# Patient Record
Sex: FEMALE | Race: WHITE | URBAN - NONMETROPOLITAN AREA
[De-identification: names, ages, dates, MRNs, and addresses within clinical notes are randomized per-mention and may not be internally consistent; named-entity substitution may affect disease eponyms.]

---

## 2017-03-01 ENCOUNTER — OFFICE VISIT (OUTPATIENT)
Dept: FAMILY MEDICINE CLINIC | Age: 22
End: 2017-03-01

## 2017-03-01 VITALS
BODY MASS INDEX: 25.61 KG/M2 | TEMPERATURE: 98.8 F | RESPIRATION RATE: 12 BRPM | HEART RATE: 96 BPM | HEIGHT: 68 IN | SYSTOLIC BLOOD PRESSURE: 122 MMHG | DIASTOLIC BLOOD PRESSURE: 70 MMHG | WEIGHT: 169 LBS

## 2017-03-01 DIAGNOSIS — J40 BRONCHITIS: ICD-10-CM

## 2017-03-01 DIAGNOSIS — J06.9 ACUTE UPPER RESPIRATORY INFECTION: Primary | ICD-10-CM

## 2017-03-01 LAB
INFLUENZA A ANTIBODY: NEGATIVE
INFLUENZA B ANTIBODY: NEGATIVE

## 2017-03-01 PROCEDURE — 99213 OFFICE O/P EST LOW 20 MIN: CPT | Performed by: NURSE PRACTITIONER

## 2017-03-01 PROCEDURE — 87804 INFLUENZA ASSAY W/OPTIC: CPT | Performed by: NURSE PRACTITIONER

## 2017-03-01 RX ORDER — BENZONATATE 100 MG/1
100 CAPSULE ORAL 3 TIMES DAILY PRN
Qty: 30 CAPSULE | Refills: 0 | Status: SHIPPED | OUTPATIENT
Start: 2017-03-01

## 2017-03-07 DIAGNOSIS — J40 BRONCHITIS: Primary | ICD-10-CM

## 2017-03-07 RX ORDER — AZITHROMYCIN 250 MG/1
TABLET, FILM COATED ORAL
Qty: 1 PACKET | Refills: 0 | Status: SHIPPED | OUTPATIENT
Start: 2017-03-07 | End: 2017-03-08 | Stop reason: ALTCHOICE

## 2017-03-08 ENCOUNTER — TELEPHONE (OUTPATIENT)
Dept: FAMILY MEDICINE CLINIC | Age: 22
End: 2017-03-08

## 2017-03-08 DIAGNOSIS — J40 BRONCHITIS: Primary | ICD-10-CM

## 2017-03-08 RX ORDER — CEFDINIR 300 MG/1
300 CAPSULE ORAL 2 TIMES DAILY
Qty: 20 CAPSULE | Refills: 0 | Status: SHIPPED | OUTPATIENT
Start: 2017-03-08 | End: 2017-03-18

## 2018-03-29 ENCOUNTER — OFFICE VISIT CONVERTED (OUTPATIENT)
Dept: ORTHOPEDIC SURGERY | Facility: CLINIC | Age: 23
End: 2018-03-29
Attending: ORTHOPAEDIC SURGERY

## 2018-06-05 ENCOUNTER — OFFICE VISIT CONVERTED (OUTPATIENT)
Dept: ORTHOPEDIC SURGERY | Facility: CLINIC | Age: 23
End: 2018-06-05
Attending: ORTHOPAEDIC SURGERY

## 2018-07-06 ENCOUNTER — OFFICE VISIT CONVERTED (OUTPATIENT)
Dept: ORTHOPEDIC SURGERY | Facility: CLINIC | Age: 23
End: 2018-07-06
Attending: ORTHOPAEDIC SURGERY

## 2021-05-16 VITALS — WEIGHT: 155 LBS | BODY MASS INDEX: 23.49 KG/M2 | HEIGHT: 68 IN

## 2021-05-16 VITALS — WEIGHT: 160 LBS | HEIGHT: 68 IN | OXYGEN SATURATION: 97 % | HEART RATE: 86 BPM | BODY MASS INDEX: 24.25 KG/M2

## 2021-05-16 VITALS — HEIGHT: 68 IN | HEART RATE: 79 BPM | OXYGEN SATURATION: 98 % | WEIGHT: 155.37 LBS | BODY MASS INDEX: 23.55 KG/M2

## 2023-09-13 ENCOUNTER — HOSPITAL ENCOUNTER (OUTPATIENT)
Dept: GENERAL RADIOLOGY | Facility: HOSPITAL | Age: 28
Discharge: HOME | End: 2023-09-13
Payer: COMMERCIAL

## 2023-09-13 ENCOUNTER — TRANSCRIBE ORDERS (OUTPATIENT)
Dept: GENERAL RADIOLOGY | Facility: HOSPITAL | Age: 28
End: 2023-09-13

## 2023-09-13 DIAGNOSIS — M79.621 PAIN IN RIGHT UPPER ARM: ICD-10-CM

## 2023-09-13 DIAGNOSIS — M79.621 PAIN IN RIGHT UPPER ARM: Primary | ICD-10-CM

## 2023-09-13 PROCEDURE — 73030 X-RAY EXAM OF SHOULDER: CPT

## 2023-09-13 PROCEDURE — 73060 X-RAY EXAM OF HUMERUS: CPT

## 2023-11-17 ENCOUNTER — PROCEDURE VISIT (OUTPATIENT)
Dept: NEUROLOGY | Facility: CLINIC | Age: 28
End: 2023-11-17
Payer: COMMERCIAL

## 2023-11-17 VITALS
HEART RATE: 94 BPM | WEIGHT: 188 LBS | HEIGHT: 68 IN | SYSTOLIC BLOOD PRESSURE: 126 MMHG | DIASTOLIC BLOOD PRESSURE: 74 MMHG | BODY MASS INDEX: 28.49 KG/M2

## 2023-11-17 DIAGNOSIS — G89.29 CHRONIC RIGHT SHOULDER PAIN: Primary | ICD-10-CM

## 2023-11-17 DIAGNOSIS — M25.511 CHRONIC RIGHT SHOULDER PAIN: Primary | ICD-10-CM

## 2023-11-18 PROBLEM — M25.511 CHRONIC RIGHT SHOULDER PAIN: Status: ACTIVE | Noted: 2023-11-18

## 2023-11-18 PROBLEM — G89.29 CHRONIC RIGHT SHOULDER PAIN: Status: ACTIVE | Noted: 2023-11-18

## 2023-11-18 NOTE — PROGRESS NOTES
"Chief Complaint  Nerve Study (RUE) and Neurologic Problem (Sharp shooting pain in RUE)    Subjective          Dalia Lucio is a 28 y.o. female who presents to [facility]  History of Present Illness  28-year-old woman evaluated for EMG nerve conduction study.  She is complaining of right shoulder pain and numbness in her hands with raising her arms up.  Worst pain is in her triceps posteriorly.  This happens when she raises her arms.  Objective   Vital Signs:   /74 (BP Location: Left arm, Patient Position: Sitting, Cuff Size: Adult)   Pulse 94   Ht 172.7 cm (67.99\")   Wt 85.3 kg (188 lb)   BMI 28.59 kg/m²     Physical Exam   There is no weakness of the upper extremity on individual muscle testing.  Range of motion is almost normal except there is pain on resting and elevating her arm forward and above her head.  Motion reaching her back is normal.  There is no pain on external rotation elevation.  Phalen sign is negative.        Assessment and Plan  Diagnoses and all orders for this visit:    1. Chronic right shoulder pain (Primary)  Assessment & Plan:  Nerve conduction study and limited EMG of the right upper extremity is normal.  Her complaints of chronic right shoulder pain on elevation is likely to be an orthopedic problem such as subacromial impingement syndrome.  Orthopedic surgery for a proper diagnosis.  I do not think it is related to her brachial plexus or thoracic outlet syndrome.  Thank you for letting me participate in her care.    Orders:  -     Ambulatory Referral to Orthopedic Surgery         Nerve Conduction Study:  4 nerves     EMG:  Limited    Total time spent with the patient and coordinating patient care was 15 minutes.    Follow Up  No follow-ups on file.  Patient was given instructions and counseling regarding her condition or for health maintenance advice. Please see specific information pulled into the AVS if appropriate.       "

## 2023-11-19 NOTE — ASSESSMENT & PLAN NOTE
Nerve conduction study and limited EMG of the right upper extremity is normal.  Her complaints of chronic right shoulder pain on elevation is likely to be an orthopedic problem such as subacromial impingement syndrome.  Orthopedic surgery for a proper diagnosis.  I do not think it is related to her brachial plexus or thoracic outlet syndrome.  Thank you for letting me participate in her care.

## 2023-11-28 ENCOUNTER — OFFICE VISIT (OUTPATIENT)
Dept: ORTHOPEDIC SURGERY | Facility: CLINIC | Age: 28
End: 2023-11-28
Payer: COMMERCIAL

## 2023-11-28 VITALS
BODY MASS INDEX: 28.49 KG/M2 | DIASTOLIC BLOOD PRESSURE: 75 MMHG | SYSTOLIC BLOOD PRESSURE: 120 MMHG | WEIGHT: 188 LBS | HEART RATE: 63 BPM | OXYGEN SATURATION: 96 % | HEIGHT: 68 IN

## 2023-11-28 DIAGNOSIS — M75.41 IMPINGEMENT SYNDROME OF RIGHT SHOULDER: Primary | ICD-10-CM

## 2023-11-28 PROCEDURE — 99203 OFFICE O/P NEW LOW 30 MIN: CPT | Performed by: ORTHOPAEDIC SURGERY

## 2023-11-28 RX ORDER — MELOXICAM 15 MG/1
15 TABLET ORAL DAILY
Qty: 30 TABLET | Refills: 2 | Status: SHIPPED | OUTPATIENT
Start: 2023-11-28

## 2023-11-28 NOTE — PROGRESS NOTES
"Chief Complaint  Initial Evaluation of the Right Shoulder     Subjective      Dalia Lucio presents to [facility] for evaluation of the right shoulder. She reports right shoulder pain for about 1 year. She reports it would come and go but now it is constant. She denies injury or trauma. She has pain with activity away from her body and reaching across. She admits to popping in the shoulder. She recently started therapy with no improvement.     No Known Allergies     Social History     Socioeconomic History    Marital status: Single   Tobacco Use    Smoking status: Never     Passive exposure: Never    Smokeless tobacco: Never   Substance and Sexual Activity    Alcohol use: Never    Drug use: Never    Sexual activity: Yes     Partners: Male     Birth control/protection: I.U.D.        I reviewed the patient's chief complaint, history of present illness, review of systems, past medical history, surgical history, family history, social history, medications, and allergy list.     Review of Systems     Constitutional: Denies fevers, chills, weight loss  Cardiovascular: Denies chest pain, shortness of breath  Skin: Denies rashes, acute skin changes  Neurologic: Denies headache, loss of consciousness  MSK: Right shoulder pain      Vital Signs:   /75   Pulse 63   Ht 172.7 cm (68\")   Wt 85.3 kg (188 lb)   SpO2 96%   BMI 28.59 kg/m²          Physical Exam  General: Alert. No acute distress    Ortho Exam      Right shoulder- Sensation to light touch median, radial, ulnar nerve. Positive AIN, PIN, ulnar nerve motor function. Positive pulses. Tender to the lateral upper arm. Forward elevation 175. Abduction 155. External Rotation 90. Internal rotation 80. 4+ supraspinatus strength. 5/5 infraspinatus and subscapularis. Internal rotation T-12. Pain with cross arm adduction. Negative O'poncho.     Procedures      Imaging Results (Most Recent)       None             Result Review : "       No results found.      X-rays-Unremarkable right shoulder and humerus radiograph.        Assessment and Plan     Diagnoses and all orders for this visit:    1. Impingement syndrome of right shoulder (Primary)        Discussed the treatment plan with the patient.  I reviewed her previous x-rays with the patient. Plan to continue physical therapy at this time. May consider MRI if symptoms persist. Prescription for mobic given today.     Call or return if worsening symptoms.    Follow Up     6 weeks      Patient was given instructions and counseling regarding her condition or for health maintenance advice. Please see specific information pulled into the AVS if appropriate.     Scribed for Salvador Cancino MD by Eva Pretty.  11/28/23   14:27 EST    I have personally performed the services described in this document as scribed by the above individual and it is both accurate and complete. Salvador Cancino MD 11/28/23

## 2024-01-09 ENCOUNTER — OFFICE VISIT (OUTPATIENT)
Dept: ORTHOPEDIC SURGERY | Facility: CLINIC | Age: 29
End: 2024-01-09
Payer: COMMERCIAL

## 2024-01-09 VITALS — WEIGHT: 185 LBS | HEIGHT: 68 IN | BODY MASS INDEX: 28.04 KG/M2

## 2024-01-09 DIAGNOSIS — M75.41 IMPINGEMENT SYNDROME OF RIGHT SHOULDER: Primary | ICD-10-CM

## 2024-01-09 PROCEDURE — 99213 OFFICE O/P EST LOW 20 MIN: CPT | Performed by: ORTHOPAEDIC SURGERY

## 2024-01-09 NOTE — PROGRESS NOTES
"Chief Complaint  Follow-up of the Right Shoulder     Subjective      Dalia Lucio presents to [facility] for follow  evaluation of the right shoulder. The patient has been treating her right shoulder impingement conservatively. She has been attending therapy. She has been taking mobic with minimal relief. She gets numbness to her hand with Forward elevation of the shoulder.     No Known Allergies     Social History     Socioeconomic History    Marital status:    Tobacco Use    Smoking status: Never     Passive exposure: Never    Smokeless tobacco: Never   Substance and Sexual Activity    Alcohol use: Never    Drug use: Never    Sexual activity: Yes     Partners: Male     Birth control/protection: I.U.D.        I reviewed the patient's chief complaint, history of present illness, review of systems, past medical history, surgical history, family history, social history, medications, and allergy list.     Review of Systems     Constitutional: Denies fevers, chills, weight loss  Cardiovascular: Denies chest pain, shortness of breath  Skin: Denies rashes, acute skin changes  Neurologic: Denies headache, loss of consciousness  MSK: Right shoulder pain      Vital Signs:   Ht 172.7 cm (68\")   Wt 83.9 kg (185 lb)   BMI 28.13 kg/m²          Physical Exam  General: Alert. No acute distress    Ortho Exam      Right upper extremity-  Sensation to light touch median, radial, ulnar nerve. Positive AIN, PIN, ulnar nerve motor function. Positive pulses. Tender to the lateral upper arm. Forward elevation 175. Abduction 155. External Rotation 90. Internal rotation 80. 4+ supraspinatus strength. 5/5 infraspinatus and subscapularis. Internal rotation T-12. Pain with cross arm adduction. Negative O'poncho.      Procedures      Imaging Results (Most Recent)       None             Result Review :       No results found.           Assessment and Plan     Diagnoses and all orders for this " visit:    1. Impingement syndrome of right shoulder (Primary)        Discussed the treatment plan with the patient.  The patient has failed conservative treatment at this time. Order for MRI of the right shoulder given today. The patient expressed understanding and wished to proceed.     Call or return if worsening symptoms.    Follow Up     MRI results      Patient was given instructions and counseling regarding her condition or for health maintenance advice. Please see specific information pulled into the AVS if appropriate.     Scribed for Salvador Cancino MD by Eva Pretty.  01/09/24   16:10 EST    I have personally performed the services described in this document as scribed by the above individual and it is both accurate and complete. Salvador Cancino MD 01/09/24

## 2024-03-06 ENCOUNTER — HOSPITAL ENCOUNTER (OUTPATIENT)
Dept: MRI IMAGING | Facility: HOSPITAL | Age: 29
Discharge: HOME | End: 2024-03-06
Admitting: ORTHOPAEDIC SURGERY
Payer: COMMERCIAL

## 2024-03-06 DIAGNOSIS — M75.41 IMPINGEMENT SYNDROME OF RIGHT SHOULDER: ICD-10-CM

## 2024-03-06 PROCEDURE — 73221 MRI JOINT UPR EXTREM W/O DYE: CPT

## 2024-03-14 ENCOUNTER — OFFICE VISIT (OUTPATIENT)
Dept: ORTHOPEDIC SURGERY | Facility: CLINIC | Age: 29
End: 2024-03-14
Payer: COMMERCIAL

## 2024-03-14 VITALS — HEART RATE: 72 BPM | HEIGHT: 68 IN | BODY MASS INDEX: 28.04 KG/M2 | WEIGHT: 185 LBS | OXYGEN SATURATION: 97 %

## 2024-03-14 DIAGNOSIS — M19.011 OSTEOARTHRITIS OF RIGHT SHOULDER, UNSPECIFIED OSTEOARTHRITIS TYPE: ICD-10-CM

## 2024-03-14 DIAGNOSIS — S46.011A TRAUMATIC TEAR OF RIGHT ROTATOR CUFF, UNSPECIFIED TEAR EXTENT, INITIAL ENCOUNTER: Primary | ICD-10-CM

## 2024-03-14 DIAGNOSIS — M19.019 OSTEOARTHRITIS OF AC (ACROMIOCLAVICULAR) JOINT: ICD-10-CM

## 2024-03-14 NOTE — PROGRESS NOTES
"Chief Complaint  Follow-up and Pain of the Right Shoulder     Subjective      Dalia Lucio presents to [facility] for follow up evaluation of the right shoulder. The patient recently had an MRI and is here today for those results. To review, The patient has been treating her right shoulder impingement conservatively. She has been attending therapy. She has been taking mobic with minimal relief. She gets numbness to her hand with Forward elevation of the shoulder. She reports symptoms for about 1 year.    No Known Allergies     Social History     Socioeconomic History    Marital status:    Tobacco Use    Smoking status: Never     Passive exposure: Never    Smokeless tobacco: Never   Substance and Sexual Activity    Alcohol use: Never    Drug use: Never    Sexual activity: Yes     Partners: Male     Birth control/protection: I.U.D.        I reviewed the patient's chief complaint, history of present illness, review of systems, past medical history, surgical history, family history, social history, medications, and allergy list.     Review of Systems     Constitutional: Denies fevers, chills, weight loss  Cardiovascular: Denies chest pain, shortness of breath  Skin: Denies rashes, acute skin changes  Neurologic: Denies headache, loss of consciousness  MSK: right shoulder pain      Vital Signs:   Pulse 72   Ht 172.7 cm (68\")   Wt 83.9 kg (185 lb)   SpO2 97%   BMI 28.13 kg/m²          Physical Exam  General: Alert. No acute distress    Ortho Exam      Right upper extremity-  Sensation to light touch median, radial, ulnar nerve. Positive AIN, PIN, ulnar nerve motor function. Positive pulses. Tender to the lateral upper arm. Forward elevation 175. Abduction 155. External Rotation 90. Internal rotation 80. 4+ supraspinatus strength. 5/5 infraspinatus and subscapularis. Internal rotation T-12. Pain with cross arm adduction. Negative O'poncho.       Procedures      Imaging Results " (Most Recent)       None             Result Review :       MRI Shoulder Right Without Contrast    Result Date: 3/7/2024  Narrative: PROCEDURE: MRI SHOULDER RIGHT WO CONTRAST  COMPARISON: None  INDICATIONS: INTERMITTENT RIGHT SHOULDER PAIN RADIATING INTO ARM WITH LIMITED RANGE OF MOTION  FOR 1 YEAR. NO KNOWN INJURY.      TECHNIQUE: A variety of imaging planes and parameters were utilized for visualization of suspected pathology.  Images were performed without contrast.   FINDINGS:  Changes of tendinopathy are seen throughout the rotator cuff.  There is a small focal full-thickness tear involving the distal attachment of the anterior fibers of the supraspinatus component.  Additional partial thickness bursal surface tear is noted throughout the distal supraspinatus and infraspinatus components of the cuff.  There is no significant retraction of torn fibers.  Increased overlying bursal fluid is noted.  The biceps anchor is intact.  There is no evidence for complete biceps tendon tear or distal retraction.  The tendon is identified in the expected position in the intertubercular sulcus.  No definitive labral tear is seen.  The glenohumeral joint is intact.  Mild changes of osteoarthritis are noted.  There is no significant joint effusion.  The acromioclavicular joint is intact.  Mild hypertrophic age related changes are noted.  The volume of the rotator cuff musculature is within normal limits.  No significant abnormal focal bone marrow signal is seen.  The cortical margins are grossly intact.  The surrounding soft tissues are unremarkable.      Impression:   1. Changes of tendinopathy are seen throughout the rotator cuff.  There is superimposed focal full-thickness tear involving the distal attachment of the anterior fibers of the supraspinatus component. 2. Evidence for partial thickness bursal surface tear involving the distal attachment of the supraspinatus and infraspinatus components of the cuff. 3. No evidence  for biceps tendon tear or labral tear. 4. Mild osteoarthritis of the glenohumeral joint.  Mild age related changes of the acromioclavicular joint are noted.      LAURENCE ELIZABETH MD       Electronically Signed and Approved By: LAURENCE ELIZABETH MD on 3/07/2024 at 9:27                     Assessment and Plan     Diagnoses and all orders for this visit:    1. Traumatic tear of right rotator cuff, unspecified tear extent, initial encounter (Primary)    2. Osteoarthritis of right shoulder, unspecified osteoarthritis type    3. Osteoarthritis of AC (acromioclavicular) joint        Discussed the treatment options with the patient, operative vs non-operative.  I reviewed the MRI with the patient. Discussed the risks and benefits of a right shoulder arthroscopic vs mini open rotator cuff repair, SAD, possible DCE vs continuing conservative treatment. The patient expressed understanding and wished to proceed with operative treatment.    Discussed surgery., Risks/benefits discussed with patient including, but not limited to: infection, bleeding, neurovascular damage, re-rupture, aesthetic deformity, need for further surgery, and death., Discussed with patient the implant type being used during surgery and patient understands., Surgery pamphlet given., and Call or return if worsening symptoms.    Follow Up     2 weeks postoperatively.        Patient was given instructions and counseling regarding her condition or for health maintenance advice. Please see specific information pulled into the AVS if appropriate.     Scribed for Salvador Cancino MD by Eva Pretty.  03/14/24   15:56 EDT    I have personally performed the services described in this document as scribed by the above individual and it is both accurate and complete. Salvador Cancino MD 03/14/24

## 2024-03-14 NOTE — H&P (VIEW-ONLY)
"Chief Complaint  Follow-up and Pain of the Right Shoulder     Subjective      Dalia Lucio presents to [facility] for follow up evaluation of the right shoulder. The patient recently had an MRI and is here today for those results. To review, The patient has been treating her right shoulder impingement conservatively. She has been attending therapy. She has been taking mobic with minimal relief. She gets numbness to her hand with Forward elevation of the shoulder. She reports symptoms for about 1 year.    No Known Allergies     Social History     Socioeconomic History    Marital status:    Tobacco Use    Smoking status: Never     Passive exposure: Never    Smokeless tobacco: Never   Substance and Sexual Activity    Alcohol use: Never    Drug use: Never    Sexual activity: Yes     Partners: Male     Birth control/protection: I.U.D.        I reviewed the patient's chief complaint, history of present illness, review of systems, past medical history, surgical history, family history, social history, medications, and allergy list.     Review of Systems     Constitutional: Denies fevers, chills, weight loss  Cardiovascular: Denies chest pain, shortness of breath  Skin: Denies rashes, acute skin changes  Neurologic: Denies headache, loss of consciousness  MSK: right shoulder pain      Vital Signs:   Pulse 72   Ht 172.7 cm (68\")   Wt 83.9 kg (185 lb)   SpO2 97%   BMI 28.13 kg/m²          Physical Exam  General: Alert. No acute distress    Ortho Exam      Right upper extremity-  Sensation to light touch median, radial, ulnar nerve. Positive AIN, PIN, ulnar nerve motor function. Positive pulses. Tender to the lateral upper arm. Forward elevation 175. Abduction 155. External Rotation 90. Internal rotation 80. 4+ supraspinatus strength. 5/5 infraspinatus and subscapularis. Internal rotation T-12. Pain with cross arm adduction. Negative O'poncho.       Procedures      Imaging Results " Appt scheduled for 3/14/18, patient notified. dmk   (Most Recent)       None             Result Review :       MRI Shoulder Right Without Contrast    Result Date: 3/7/2024  Narrative: PROCEDURE: MRI SHOULDER RIGHT WO CONTRAST  COMPARISON: None  INDICATIONS: INTERMITTENT RIGHT SHOULDER PAIN RADIATING INTO ARM WITH LIMITED RANGE OF MOTION  FOR 1 YEAR. NO KNOWN INJURY.      TECHNIQUE: A variety of imaging planes and parameters were utilized for visualization of suspected pathology.  Images were performed without contrast.   FINDINGS:  Changes of tendinopathy are seen throughout the rotator cuff.  There is a small focal full-thickness tear involving the distal attachment of the anterior fibers of the supraspinatus component.  Additional partial thickness bursal surface tear is noted throughout the distal supraspinatus and infraspinatus components of the cuff.  There is no significant retraction of torn fibers.  Increased overlying bursal fluid is noted.  The biceps anchor is intact.  There is no evidence for complete biceps tendon tear or distal retraction.  The tendon is identified in the expected position in the intertubercular sulcus.  No definitive labral tear is seen.  The glenohumeral joint is intact.  Mild changes of osteoarthritis are noted.  There is no significant joint effusion.  The acromioclavicular joint is intact.  Mild hypertrophic age related changes are noted.  The volume of the rotator cuff musculature is within normal limits.  No significant abnormal focal bone marrow signal is seen.  The cortical margins are grossly intact.  The surrounding soft tissues are unremarkable.      Impression:   1. Changes of tendinopathy are seen throughout the rotator cuff.  There is superimposed focal full-thickness tear involving the distal attachment of the anterior fibers of the supraspinatus component. 2. Evidence for partial thickness bursal surface tear involving the distal attachment of the supraspinatus and infraspinatus components of the cuff. 3. No evidence  for biceps tendon tear or labral tear. 4. Mild osteoarthritis of the glenohumeral joint.  Mild age related changes of the acromioclavicular joint are noted.      LAURENCE ELIZABETH MD       Electronically Signed and Approved By: LAURENCE ELIZABETH MD on 3/07/2024 at 9:27                     Assessment and Plan     Diagnoses and all orders for this visit:    1. Traumatic tear of right rotator cuff, unspecified tear extent, initial encounter (Primary)    2. Osteoarthritis of right shoulder, unspecified osteoarthritis type    3. Osteoarthritis of AC (acromioclavicular) joint        Discussed the treatment options with the patient, operative vs non-operative.  I reviewed the MRI with the patient. Discussed the risks and benefits of a right shoulder arthroscopic vs mini open rotator cuff repair, SAD, possible DCE vs continuing conservative treatment. The patient expressed understanding and wished to proceed with operative treatment.    Discussed surgery., Risks/benefits discussed with patient including, but not limited to: infection, bleeding, neurovascular damage, re-rupture, aesthetic deformity, need for further surgery, and death., Discussed with patient the implant type being used during surgery and patient understands., Surgery pamphlet given., and Call or return if worsening symptoms.    Follow Up     2 weeks postoperatively.        Patient was given instructions and counseling regarding her condition or for health maintenance advice. Please see specific information pulled into the AVS if appropriate.     Scribed for Salvador Cancino MD by Eva Pretty.  03/14/24   15:56 EDT    I have personally performed the services described in this document as scribed by the above individual and it is both accurate and complete. Salvador Cancino MD 03/14/24

## 2024-03-15 PROBLEM — S46.011A TRAUMATIC TEAR OF RIGHT ROTATOR CUFF: Status: ACTIVE | Noted: 2024-03-14

## 2024-04-08 ENCOUNTER — ANESTHESIA EVENT (OUTPATIENT)
Dept: PERIOP | Facility: HOSPITAL | Age: 29
End: 2024-04-08
Payer: COMMERCIAL

## 2024-04-08 ENCOUNTER — HOSPITAL ENCOUNTER (OUTPATIENT)
Facility: HOSPITAL | Age: 29
Setting detail: HOSPITAL OUTPATIENT SURGERY
Discharge: HOME | End: 2024-04-08
Attending: ORTHOPAEDIC SURGERY | Admitting: ORTHOPAEDIC SURGERY
Payer: COMMERCIAL

## 2024-04-08 ENCOUNTER — ANESTHESIA (OUTPATIENT)
Dept: PERIOP | Facility: HOSPITAL | Age: 29
End: 2024-04-08
Payer: COMMERCIAL

## 2024-04-08 ENCOUNTER — ANESTHESIA EVENT CONVERTED (OUTPATIENT)
Dept: ANESTHESIOLOGY | Facility: HOSPITAL | Age: 29
End: 2024-04-08
Payer: COMMERCIAL

## 2024-04-08 VITALS
RESPIRATION RATE: 16 BRPM | OXYGEN SATURATION: 97 % | HEIGHT: 68 IN | TEMPERATURE: 97.1 F | BODY MASS INDEX: 27.77 KG/M2 | WEIGHT: 183.2 LBS | DIASTOLIC BLOOD PRESSURE: 69 MMHG | SYSTOLIC BLOOD PRESSURE: 105 MMHG | HEART RATE: 73 BPM

## 2024-04-08 DIAGNOSIS — S46.011A TRAUMATIC TEAR OF RIGHT ROTATOR CUFF, UNSPECIFIED TEAR EXTENT, INITIAL ENCOUNTER: ICD-10-CM

## 2024-04-08 LAB — B-HCG UR QL: NEGATIVE

## 2024-04-08 PROCEDURE — 25010000002 ROPIVACAINE PER 1 MG: Performed by: ANESTHESIOLOGY

## 2024-04-08 PROCEDURE — 25010000002 PROPOFOL 10 MG/ML EMULSION: Performed by: NURSE ANESTHETIST, CERTIFIED REGISTERED

## 2024-04-08 PROCEDURE — L3670 SO ACRO/CLAV CAN WEB PRE OTS: HCPCS | Performed by: ORTHOPAEDIC SURGERY

## 2024-04-08 PROCEDURE — 25010000002 FENTANYL CITRATE (PF) 50 MCG/ML SOLUTION: Performed by: NURSE ANESTHETIST, CERTIFIED REGISTERED

## 2024-04-08 PROCEDURE — 25810000003 LACTATED RINGERS PER 1000 ML: Performed by: ANESTHESIOLOGY

## 2024-04-08 PROCEDURE — 25010000002 FENTANYL CITRATE (PF) 50 MCG/ML SOLUTION: Performed by: ANESTHESIOLOGY

## 2024-04-08 PROCEDURE — 25010000002 DEXAMETHASONE PER 1 MG: Performed by: NURSE ANESTHETIST, CERTIFIED REGISTERED

## 2024-04-08 PROCEDURE — 81025 URINE PREGNANCY TEST: CPT | Performed by: ORTHOPAEDIC SURGERY

## 2024-04-08 PROCEDURE — 25010000002 MIDAZOLAM PER 1MG: Performed by: ANESTHESIOLOGY

## 2024-04-08 PROCEDURE — 25010000002 MEPERIDINE PER 100 MG: Performed by: NURSE ANESTHETIST, CERTIFIED REGISTERED

## 2024-04-08 PROCEDURE — 25010000002 ONDANSETRON PER 1 MG: Performed by: NURSE ANESTHETIST, CERTIFIED REGISTERED

## 2024-04-08 PROCEDURE — C1713 ANCHOR/SCREW BN/BN,TIS/BN: HCPCS | Performed by: ORTHOPAEDIC SURGERY

## 2024-04-08 PROCEDURE — 25010000002 SUGAMMADEX 200 MG/2ML SOLUTION: Performed by: NURSE ANESTHETIST, CERTIFIED REGISTERED

## 2024-04-08 PROCEDURE — 25010000002 CEFAZOLIN SODIUM 2 G RECONSTITUTED SOLUTION: Performed by: ORTHOPAEDIC SURGERY

## 2024-04-08 DEVICE — SUT/ANCH 2.6/FIBERTAK DBL/LD/W/1.3MM SUT TP SP: Type: IMPLANTABLE DEVICE | Site: SHOULDER | Status: FUNCTIONAL

## 2024-04-08 DEVICE — SUT/ANCH BIOCOMP SWIVELOCK/C 4.75X19.1MM: Type: IMPLANTABLE DEVICE | Site: SHOULDER | Status: FUNCTIONAL

## 2024-04-08 RX ORDER — ROCURONIUM BROMIDE 10 MG/ML
INJECTION, SOLUTION INTRAVENOUS AS NEEDED
Status: DISCONTINUED | OUTPATIENT
Start: 2024-04-08 | End: 2024-04-08 | Stop reason: SURG

## 2024-04-08 RX ORDER — HYDROCODONE BITARTRATE AND ACETAMINOPHEN 7.5; 325 MG/1; MG/1
1-2 TABLET ORAL EVERY 4 HOURS PRN
Qty: 40 TABLET | Refills: 0 | Status: SHIPPED | OUTPATIENT
Start: 2024-04-08

## 2024-04-08 RX ORDER — LIDOCAINE HYDROCHLORIDE 20 MG/ML
INJECTION, SOLUTION EPIDURAL; INFILTRATION; INTRACAUDAL; PERINEURAL AS NEEDED
Status: DISCONTINUED | OUTPATIENT
Start: 2024-04-08 | End: 2024-04-08 | Stop reason: SURG

## 2024-04-08 RX ORDER — ACETAMINOPHEN 500 MG
1000 TABLET ORAL ONCE
Status: COMPLETED | OUTPATIENT
Start: 2024-04-08 | End: 2024-04-08

## 2024-04-08 RX ORDER — SODIUM CHLORIDE, SODIUM LACTATE, POTASSIUM CHLORIDE, CALCIUM CHLORIDE 600; 310; 30; 20 MG/100ML; MG/100ML; MG/100ML; MG/100ML
9 INJECTION, SOLUTION INTRAVENOUS CONTINUOUS PRN
Status: DISCONTINUED | OUTPATIENT
Start: 2024-04-08 | End: 2024-04-08 | Stop reason: HOSPADM

## 2024-04-08 RX ORDER — MIDAZOLAM HYDROCHLORIDE 2 MG/2ML
2 INJECTION, SOLUTION INTRAMUSCULAR; INTRAVENOUS ONCE
Status: COMPLETED | OUTPATIENT
Start: 2024-04-08 | End: 2024-04-08

## 2024-04-08 RX ORDER — IBUPROFEN 800 MG/1
800 TABLET ORAL EVERY 8 HOURS PRN
Qty: 60 TABLET | Refills: 1 | Status: SHIPPED | OUTPATIENT
Start: 2024-04-08

## 2024-04-08 RX ORDER — MEPERIDINE HYDROCHLORIDE 25 MG/ML
12.5 INJECTION INTRAMUSCULAR; INTRAVENOUS; SUBCUTANEOUS
Status: DISCONTINUED | OUTPATIENT
Start: 2024-04-08 | End: 2024-04-08 | Stop reason: HOSPADM

## 2024-04-08 RX ORDER — BUPIVACAINE HCL/0.9 % NACL/PF 0.1 %
2 PLASTIC BAG, INJECTION (ML) EPIDURAL ONCE
Status: COMPLETED | OUTPATIENT
Start: 2024-04-08 | End: 2024-04-08

## 2024-04-08 RX ORDER — PROMETHAZINE HYDROCHLORIDE 25 MG/1
25 SUPPOSITORY RECTAL ONCE AS NEEDED
Status: DISCONTINUED | OUTPATIENT
Start: 2024-04-08 | End: 2024-04-08 | Stop reason: HOSPADM

## 2024-04-08 RX ORDER — FENTANYL CITRATE 50 UG/ML
100 INJECTION, SOLUTION INTRAMUSCULAR; INTRAVENOUS ONCE
Status: COMPLETED | OUTPATIENT
Start: 2024-04-08 | End: 2024-04-08

## 2024-04-08 RX ORDER — DEXAMETHASONE SODIUM PHOSPHATE 4 MG/ML
INJECTION, SOLUTION INTRA-ARTICULAR; INTRALESIONAL; INTRAMUSCULAR; INTRAVENOUS; SOFT TISSUE AS NEEDED
Status: DISCONTINUED | OUTPATIENT
Start: 2024-04-08 | End: 2024-04-08 | Stop reason: SURG

## 2024-04-08 RX ORDER — ONDANSETRON 2 MG/ML
INJECTION INTRAMUSCULAR; INTRAVENOUS AS NEEDED
Status: DISCONTINUED | OUTPATIENT
Start: 2024-04-08 | End: 2024-04-08 | Stop reason: SURG

## 2024-04-08 RX ORDER — ROPIVACAINE HYDROCHLORIDE 5 MG/ML
INJECTION, SOLUTION EPIDURAL; INFILTRATION; PERINEURAL
Status: COMPLETED | OUTPATIENT
Start: 2024-04-08 | End: 2024-04-08

## 2024-04-08 RX ORDER — PROMETHAZINE HYDROCHLORIDE 12.5 MG/1
25 TABLET ORAL ONCE AS NEEDED
Status: DISCONTINUED | OUTPATIENT
Start: 2024-04-08 | End: 2024-04-08 | Stop reason: HOSPADM

## 2024-04-08 RX ORDER — ONDANSETRON 2 MG/ML
4 INJECTION INTRAMUSCULAR; INTRAVENOUS ONCE AS NEEDED
Status: DISCONTINUED | OUTPATIENT
Start: 2024-04-08 | End: 2024-04-08 | Stop reason: HOSPADM

## 2024-04-08 RX ORDER — FENTANYL CITRATE 50 UG/ML
INJECTION, SOLUTION INTRAMUSCULAR; INTRAVENOUS AS NEEDED
Status: DISCONTINUED | OUTPATIENT
Start: 2024-04-08 | End: 2024-04-08 | Stop reason: SURG

## 2024-04-08 RX ORDER — SCOLOPAMINE TRANSDERMAL SYSTEM 1 MG/1
1 PATCH, EXTENDED RELEASE TRANSDERMAL ONCE
Status: DISCONTINUED | OUTPATIENT
Start: 2024-04-08 | End: 2024-04-08 | Stop reason: HOSPADM

## 2024-04-08 RX ORDER — PROPOFOL 10 MG/ML
VIAL (ML) INTRAVENOUS AS NEEDED
Status: DISCONTINUED | OUTPATIENT
Start: 2024-04-08 | End: 2024-04-08 | Stop reason: SURG

## 2024-04-08 RX ORDER — NALOXONE HYDROCHLORIDE 4 MG/.1ML
SPRAY NASAL
Qty: 2 EACH | Refills: 0 | Status: SHIPPED | OUTPATIENT
Start: 2024-04-08

## 2024-04-08 RX ORDER — OXYCODONE HYDROCHLORIDE 5 MG/1
5 TABLET ORAL
Status: DISCONTINUED | OUTPATIENT
Start: 2024-04-08 | End: 2024-04-08 | Stop reason: HOSPADM

## 2024-04-08 RX ADMIN — ROCURONIUM BROMIDE 40 MG: 10 INJECTION, SOLUTION INTRAVENOUS at 13:20

## 2024-04-08 RX ADMIN — Medication 2 G: at 13:23

## 2024-04-08 RX ADMIN — ACETAMINOPHEN 1000 MG: 500 TABLET ORAL at 12:05

## 2024-04-08 RX ADMIN — ONDANSETRON HYDROCHLORIDE 4 MG: 2 SOLUTION INTRAMUSCULAR; INTRAVENOUS at 13:40

## 2024-04-08 RX ADMIN — FENTANYL CITRATE 25 MCG: 50 INJECTION, SOLUTION INTRAMUSCULAR; INTRAVENOUS at 13:25

## 2024-04-08 RX ADMIN — MIDAZOLAM HYDROCHLORIDE 2 MG: 1 INJECTION, SOLUTION INTRAMUSCULAR; INTRAVENOUS at 12:12

## 2024-04-08 RX ADMIN — FENTANYL CITRATE 50 MCG: 50 INJECTION, SOLUTION INTRAMUSCULAR; INTRAVENOUS at 13:18

## 2024-04-08 RX ADMIN — MEPERIDINE HYDROCHLORIDE 12.5 MG: 25 INJECTION INTRAMUSCULAR; INTRAVENOUS; SUBCUTANEOUS at 14:57

## 2024-04-08 RX ADMIN — DEXAMETHASONE SODIUM PHOSPHATE 4 MG: 4 INJECTION, SOLUTION INTRAMUSCULAR; INTRAVENOUS at 13:28

## 2024-04-08 RX ADMIN — FENTANYL CITRATE 25 MCG: 50 INJECTION, SOLUTION INTRAMUSCULAR; INTRAVENOUS at 14:31

## 2024-04-08 RX ADMIN — SCOPALAMINE 1 PATCH: 1 PATCH, EXTENDED RELEASE TRANSDERMAL at 12:20

## 2024-04-08 RX ADMIN — PROPOFOL 180 MG: 10 INJECTION, EMULSION INTRAVENOUS at 13:20

## 2024-04-08 RX ADMIN — SUGAMMADEX 200 MG: 100 INJECTION, SOLUTION INTRAVENOUS at 14:37

## 2024-04-08 RX ADMIN — ROPIVACAINE HYDROCHLORIDE 20 ML: 5 INJECTION, SOLUTION EPIDURAL; INFILTRATION; PERINEURAL at 12:16

## 2024-04-08 RX ADMIN — FENTANYL CITRATE 100 MCG: 50 INJECTION, SOLUTION INTRAMUSCULAR; INTRAVENOUS at 12:12

## 2024-04-08 RX ADMIN — SODIUM CHLORIDE, POTASSIUM CHLORIDE, SODIUM LACTATE AND CALCIUM CHLORIDE 9 ML/HR: 600; 310; 30; 20 INJECTION, SOLUTION INTRAVENOUS at 12:22

## 2024-04-08 RX ADMIN — SODIUM CHLORIDE, POTASSIUM CHLORIDE, SODIUM LACTATE AND CALCIUM CHLORIDE: 600; 310; 30; 20 INJECTION, SOLUTION INTRAVENOUS at 14:40

## 2024-04-08 RX ADMIN — LIDOCAINE HYDROCHLORIDE 60 MG: 20 INJECTION, SOLUTION INTRAVENOUS at 13:18

## 2024-04-08 NOTE — ANESTHESIA PREPROCEDURE EVALUATION
Anesthesia Evaluation     Patient summary reviewed and Nursing notes reviewed   no history of anesthetic complications:   NPO Solid Status: > 8 hours  NPO Liquid Status: > 2 hours           Airway   Mallampati: I  TM distance: >3 FB  Neck ROM: full  Dental - normal exam     Pulmonary - negative pulmonary ROS and normal exam   Cardiovascular - negative cardio ROS and normal exam  Exercise tolerance: good (4-7 METS)        Neuro/Psych- negative ROS  GI/Hepatic/Renal/Endo - negative ROS     Musculoskeletal (-) negative ROS    Abdominal  - normal exam   Substance History - negative use     OB/GYN negative ob/gyn ROS         Other - negative ROS       ROS/Med Hx Other: PAT Nursing Notes unavailable.               Anesthesia Plan    ASA 1     general with block     intravenous induction     Anesthetic plan, risks, benefits, and alternatives have been provided, discussed and informed consent has been obtained with: patient.    Plan discussed with CRNA.    CODE STATUS:

## 2024-04-08 NOTE — OP NOTE
SHOULDER ARTHROSCOPY WITH ROTATOR CUFF REPAIR  Procedure Report    Patient Name:  Dalia Lucio  YOB: 1995    Date of Surgery:  4/8/2024     Indications:  Patient has rotator cuff tear and has failed conservative treatment. Risks and benefits of surgery were discussed, including bleeding, infection, damage to neurovascular structures, anesthesia complications including death, continued pain and disability, need for additional procedures, among others. Informed consent was obtained and they wished to proceed.    Pre-op Diagnosis:   Traumatic tear of right rotator cuff, unspecified tear extent, initial encounter [S46.011A]       Post-Op Diagnosis Codes:     * Traumatic tear of right rotator cuff, unspecified tear extent, initial encounter [S46.011A]    Procedure/CPT® Codes:      Procedure(s):  right SHOULDER ARTHROSCOPY, MINI OPEN ROTATOR CUFF REPAIR SUBACROMIAL DECOMPRESSION    Staff:  Surgeon(s):  Salvador Cancino MD    Assistant: Patrick Kenney RN    Anesthesia: General with Block    Estimated Blood Loss:  20cc    Implants:    Implant Name Type Inv. Item Serial No.  Lot No. LRB No. Used Action   SUT/ANCH 2.6/FIBERTAK DBL/LD/W/1.3MM SUT TP SP - [id number] Implant SUT/ANCH 2.6/FIBERTAK DBL/LD/W/1.3MM SUT TP   [employer] [id number] Right 1 Implanted   SUT/ANCH BIOCOMP SWIVELOCK/C 4.75X19.1MM - [id number] Implant SUT/ANCH BIOCOMP SWIVELOCK/C 4.75X19.1MM  [employer] [id number] Right 1 Implanted       Specimen:          None        Findings: rotator cuff tear    Complications: none    Description of Procedure: The operative site was marked in preoperative holding area.  Interscalene nerve block was performed by the anesthesia provider.  The patient was brought the operating room and general anesthesia was applied.  There were placed in the lateral decubitus position.  An axillary roll was placed and the patient was secured with a deflated beanbag.  The legs were padded and SCD boots  were placed.  The contralateral limb was placed on an arm board and the affected limb was prepped and draped in usual sterile fashion and placed into a sterile traction arm smith.  Preoperative antibiotic was given.  Formal timeout was held.    Standard posterior portal was established and the arthroscope was inserted into the glenohumeral joint.  An anterior portal was established in the rotator cuff interval and a cannula was placed anteriorly.  The joint was inspected and the intra-articular and findings included intact biceps tendon.  Intact labrum.  No significant chondromalacia.  Small what appears to be full-thickness nonretracted tear of the anterior supraspinatus just posterior to the biceps tendon.    The arthroscope was removed and reinserted into the subacromial space posteriorly.  The lateral portal was established of the lateral acromion.  A motorized shaver and ablation probe were used to clear the subacromial space of adhesions and bursitis.  A motorized bur was used to resect approximately 1 cm from the anterior acromion and flatten the acromion from anterior to posterior and lateral to medial.    The subacromial findings include downsloping acromion.  There was a thinned portion of the anterior supraspinatus tendon with a small full-thickness tear measuring around 0.5 to 1 cm anterior to posterior diameter.  This was not significantly retracted.    At this point the arthroscope was removed and the lateral portal was extended into a 3 cm incision.  The deltoid was split and the bursa was excised.  The rotator cuff tear was identified and mobilized.  A bleeding surface was treated at the greater tuberosity and a fiber tack Arthrex anchors was placed for medial row at the anterior medial greater tuberosity placed.  A scorpion suture passer was used to pass sutures through the rotator cuff and they were tied in alternating  knot fashion.  The sutures were then passed laterally through a swivel lock  anchor which was placed for a lateral row anchor.  The sutures were tensioned and the anchor was deployed.  The sutures were cut and the repair was secure.    The wound was irrigated and was closed with #1 Vicryl at the deltoid fascia level, 2-0 Vicryl at the subcutaneous level, and running Monocryl suture at the skin.  Mastisol Steri-Strips and sterile dressing was placed.  The portal incisions were closed with nylon suture.    Sterile dressing was placed.  The patient was placed into cold therapy and an immobilizer.  The patient awoke from anesthesia in stable condition.  There is no complications.  All counts were correct.    Assistant: Patrick Kenney RN  was responsible for performing the following activities: Retraction, Suction, Irrigation, and Placing Dressing and their skilled assistance was necessary for the success of this case.    SURGICAL APPROACH: Palmer Cancino MD     Date: 4/8/2024  Time: 14:36 EDT

## 2024-04-08 NOTE — DISCHARGE INSTRUCTIONS
DISCHARGE INSTRUCTIONS  Post-Operative  Arthroscopic Shoulder Surgery      For your surgery you had:  General anesthesia (you may have a sore throat for the first 24 hours)  You received an anesthesia medication today that can cause hormonal forms of birth control to be ineffective. You should use a different form of birth control (to prevent pregnancy) for 7 days.       You may experience dizziness, drowsiness, or light-headedness for several hours following surgery/procedure.  Do not stay alone today or tonight.  Limit your activity for 24 hours.  Resume your diet slowly.  Follow whatever special dietary instructions you may have been given by your doctor.  You should not drive or operate machinery or drink alcohol for 24 hours or while you are taking pain medication.  You should not sign legally binding documents.  Post-Operative Day #1 is counted as the 1st day after surgery.  [x] If you had a regional block, you will not have control of your arm for up to 12 hours.  Protect the arm with a sling or follow your physician's specific instructions.  Post-Operative Day #2 wednesday  Remove your dressing.  Under the dressing you will either have sutures or staples.  Change dressing once or twice daily.  Place a dry dressing or band-aids over the small incisions.  Prior to dressing change, wash your hands.  Post-Operative Day #2 wednesday  You may shower.  During the shower, you may let the water hit the incision and roll down but do not scrub or place any soap on the incision.  Do not soak it.  Pat it dry and do not put any ointments on the incision unless directed by surgeon. Then replace the dry dressing.    General Instructions  [x] Use ice pack to shoulder for 72 hours.  This can help with reducing swelling and pain relief.   Never place ice directly on skin.  Avoid getting dressing wet.    Keep arm elevated with sling to decrease swelling and minimize throbbing pain.  The sling will provide support for your  shoulder.  It is important to remove the sling 3-5 times daily to work on range-of-motion of the elbow, wrist and hand.  You will receive a prescription for physical therapy, unless otherwise instructed by physician.  After most shoulder surgeries, it is permissible to start exercises by slowing trying to crawl your fingers up a wall until your arm is parallel to the floor.  While doing this support the affected arm at the elbow or closer to the shoulder.  You may also lean over and let the arm and hand do small or large circles or write the alphabet with your hanging arm to keep it loose.  It is normal to have some pain with these gentle exercises.  The exercises help reduce swelling and pain overall and help to avoid a stiff shoulder post-operatively.  It is okay to come out of the sling for showering or for certain activities of daily living but avoid any lifting or carrying with the affected arm until instructed by the physician especially if you have had a repair of the rotator cuff or some type of reconstructive procedure.  It is okay to come out of the sling and support the arm with a pillow if you remain sedentary.  In general, sling use is preferred following a repair or reconstruction for 4 weeks.  If you have had a SAD (sub acromial decompression), continue sling use more liberally because there was not a repair or reconstruction that could be harmed.          DISCHARGE INSTRUCTIONS  Post-Operative   Arthroscopic Shoulder Surgery      Exercise fingers for 10 minutes every hour while awake.  The physician will typically inform the family and the patient whether or not a repair or reconstruction could be harmed.  If you have had a rotator cuff repair or reconstructive procedure, do not let the arm drop down suddenly from an elevated position down to your side despite improvements made in pain and over the 3 months following repair and reconstruction.  It generally takes 8-12 weeks before the repair or  reconstruction does not rely on sutures and anchors that are placed for the repair.  You are generally given a prescription for a narcotic medication.  Take as prescribed.  You may use over-the-counter anti-inflammatory medications such as Motrin or Aleve for additional pain or fever reduction if not allergic.  If you are taking the pain medication prescribed, do not take Tylenol too unless you consult with the pharmacist. The pain medications generally have Tylenol in them and too much Tylenol can be harmful.  Sometimes it is recommended to take an anti-inflammatory in between doses of prescription pain medication if additional pain relief is needed.  Consult with your physician or your pharmacist before taking over-the-counter pain medications/anti-inflammatories.  It is not uncommon to have a fever post-operatively especially in the first 24-48 hours.  Anti-inflammatories can be used for fever reduction also.  It is normal to have some redness or irritation around skin sutures or staples, however, if you have any expanding areas of redness with a persistent fever and increasing pain notify the physician as soon as possible.  The incidence of blood clots is low following arthroscopic procedures, however, if you notice any increasing pain or swelling in your calves or legs, contact the physician as soon as possible.   It is difficult to sleep in the customary fashion following a shoulder surgery.  It is usually necessary to sleep with the shoulder above the level of the heart such as in a recliner, couch or with the head of the bed elevated.  This should slowly improve over the weeks following shoulder surgery.  If unable to urinate 6 to 8 hours after surgery or urinating frequently in small amounts, notify the physician or go to the nearest Emergency Room.  SPECIAL INSTRUCTIONS:        NOTIFY YOUR PHYSICIAN IF YOU EXPERIENCE:  Numbness of fingers.  Inability to move fingers.  Extreme coldness, paleness or blue  dis-coloration of fingers.  Any pain other than from the incision, such as swelling of the arm that blocks circulation of fingers.  Follow verbal instructions from your doctor.  Medications per physician instructions as indicated on Discharge Medication Information Sheet.  You should see  ______________________for follow-up care  on     Phone number: __________________________________  Missing your appointment/follow-up could lead to serious complications  If you have an emergency and cannot reach your doctor, go to an Emergency Room nearest your home.

## 2024-04-08 NOTE — ANESTHESIA POSTPROCEDURE EVALUATION
Patient: Dalia Lucio    Procedure Summary       Date: 04/08/24 Room / Location: [facility] OR  / [facility]    Anesthesia Start: 1313 Anesthesia Stop: 1447    Procedure: right SHOULDER ARTHROSCOPY, MINI OPEN ROTATOR CUFF REPAIR SUBACROMIAL DECOMPRESSION (Right: Shoulder) Diagnosis:       Traumatic tear of right rotator cuff, unspecified tear extent, initial encounter      (Traumatic tear of right rotator cuff, unspecified tear extent, initial encounter [S46.011A])    Surgeons: Salvador Cancino MD Provider: Cesario Dinero MD    Anesthesia Type: general with block ASA Status: 1            Anesthesia Type: general with block    Vitals  Vitals Value Taken Time   /66 04/08/24 1515   Temp 36 °C (96.8 °F) 04/08/24 1445   Pulse 80 04/08/24 1527   Resp 17 04/08/24 1445   SpO2 100 % 04/08/24 1527   Vitals shown include unfiled device data.        Post Anesthesia Care and Evaluation    Patient location during evaluation: bedside  Patient participation: complete - patient participated  Level of consciousness: awake  Pain management: adequate    Airway patency: patent  PONV Status: none  Cardiovascular status: acceptable and stable  Respiratory status: acceptable  Hydration status: acceptable    Comments: An Anesthesiologist personally participated in the most demanding procedures (including induction and emergence if applicable) in the anesthesia plan, monitored the course of anesthesia administration at frequent intervals and remained physically present and available for immediate diagnosis and treatment of emergencies.

## 2024-04-08 NOTE — ANESTHESIA PROCEDURE NOTES
Peripheral Block    Pre-sedation assessment completed: 4/8/2024 12:04 PM    Patient reassessed immediately prior to procedure    Patient location during procedure: pre-op  Start time: 4/8/2024 12:14 PM  Stop time: 4/8/2024 12:16 PM  Reason for block: at surgeon's request and post-op pain management  Performed by  Anesthesiologist: Cesario Dinero MD  Preanesthetic Checklist  Completed: patient identified, IV checked, site marked, risks and benefits discussed, surgical consent, monitors and equipment checked, pre-op evaluation and timeout performed  Prep:  Pt Position: supine (HOB elevated)  Sterile barriers:cap, washed/disinfected hands, sterile barriers, gloves, mask, partial drape and alcohol skin prep  Prep: ChloraPrep  Patient monitoring: blood pressure monitoring, continuous pulse oximetry and EKG  Procedure    Sedation: yes  Performed under: local infiltration  Guidance:ultrasound guided and nerve stimulator    ULTRASOUND INTERPRETATION.  Using ultrasound guidance a 22 G gauge needle was placed in close proximity to the brachial plexus nerve, at which point, under ultrasound guidance anesthetic was injected in the area of the nerve and spread of the anesthesia was seen on ultrasound in close proximity thereto.  There were no abnormalities seen on ultrasound; a digital image was taken; and the patient tolerated the procedure with no complications. Images:still images obtained, printed/placed on chart    Laterality:right  Block Type:interscalene  Injection Technique:single-shot  Needle Type:echogenic  Needle Gauge:22 G (2in)  Resistance on Injection: none    Medications Used: ropivacaine (NAROPIN) 0.5 % injection - Injection   20 mL - 4/8/2024 12:16:00 PM      Post Assessment  Injection Assessment: negative aspiration for heme, no paresthesia on injection and incremental injection  Patient Tolerance:comfortable throughout block  Complications:no  Additional Notes  The block or continuous infusion is requested  by the referring physician for management of postoperative pain, or pain related to a procedure. Ultrasound guidance (deemed medically necessary). Painless injection, pt was awake and conversant during the procedure without complications. Needle and surrounding structures visualized throughout procedure. No adverse reactions or complications seen during this period. Post-procedure image showed no signs of complication, and anatomy was consistent with an uncomplicated nerve blockade.

## 2024-04-22 ENCOUNTER — OFFICE VISIT (OUTPATIENT)
Dept: ORTHOPEDIC SURGERY | Facility: CLINIC | Age: 29
End: 2024-04-22
Payer: COMMERCIAL

## 2024-04-22 VITALS
WEIGHT: 183.2 LBS | SYSTOLIC BLOOD PRESSURE: 116 MMHG | BODY MASS INDEX: 27.77 KG/M2 | OXYGEN SATURATION: 98 % | DIASTOLIC BLOOD PRESSURE: 74 MMHG | HEART RATE: 72 BPM | HEIGHT: 68 IN

## 2024-04-22 DIAGNOSIS — Z47.89 AFTERCARE FOLLOWING SURGERY OF THE MUSCULOSKELETAL SYSTEM: Primary | ICD-10-CM

## 2024-04-22 PROCEDURE — 99024 POSTOP FOLLOW-UP VISIT: CPT | Performed by: PHYSICIAN ASSISTANT

## 2024-04-22 NOTE — PROGRESS NOTES
"Chief Complaint  Pain and Follow-up of the Right Shoulder and Suture / Staple Removal    Subjective          History of Present Illness      Dalia Lucio is a 28 y.o. female  presents to [facility] for     Patient presents for 2-week postop evaluation right shoulder arthroscopic subacromial decompression, mini open rotator cuff repair, 4/8/2024.  Patient states she has been doing well with pain control she states she has not needed the pain medicine she still has some if she needs it but mainly she has been using ice and ibuprofen.  She is attending therapy at \[facility]\"" in [place] she does her home exercises.  She presents in her sling she states the sling has been causing her pain but she is compliant with it.  She also states she has some pain in the radial wrist with movement.  She denies injury to the wrist she denies numbness and tingling.      No Known Allergies     Social History     Socioeconomic History    Marital status:    Tobacco Use    Smoking status: Never     Passive exposure: Never    Smokeless tobacco: Never   Vaping Use    Vaping status: Never Used   Substance and Sexual Activity    Alcohol use: Never    Drug use: Never    Sexual activity: Yes     Partners: Male     Birth control/protection: I.U.D.        REVIEW OF SYSTEMS    Constitutional: Awake alert and oriented x3, no acute distress, denies fevers, chills, weight loss  Respiratory: No respiratory distress  Vascular: Brisk cap refill, Intact distal pulses, No cyanosis, compartments soft with no signs or symptoms of compartment syndrome or DVT.   Cardiovascular: Denies chest pain, shortness of breath  Skin: Denies rashes, acute skin changes  Neurologic: Denies headache, loss of consciousness  MSK: Right shoulder pain      Objective   Vital Signs:   /74   Pulse 72   Ht 172.7 cm (68\")   Wt 83.1 kg (183 lb 3.2 oz)   SpO2 98%   BMI 27.86 kg/m²     Body mass index is 27.86 kg/m².    Physical " Exam       Right shoulder: Incisions are healing well, no erythema, no drainage or dehiscence, no signs of infection, sutures were removed and Steri-Strips were placed.  Range of motion appropriate, elbow wrist hand range of motion intact/appropriate, she does have tenderness to the radial wrist with ulnar and radial deviation pain is in this region, no pain with extension and flexion, patient will wiggle fingers and thumb makes a full fist.      Procedures    Imaging Results (Most Recent)       None             Result Review :   The following data was reviewed by: JOSUE Mclean on 04/22/2024:               Assessment and Plan    Diagnoses and all orders for this visit:    1. Aftercare following surgery of right shoulder arthroscopic subacromial decompression, mini open rotator cuff repair, 4/8/2024 (Primary)        Reviewed operative report with and operative images with the patient she was advised to continue sling use for another 4 weeks, avoid heavy lift push pull activity, she has pain meds if she needs it she may call for refills.  Sutures/staples removed in office today. Steri-strips applied. Discussed incision care/hygiene. May shower, but do not submerge in water until fully healed.  Advised patient that if any concerning symptoms regarding incision appearance occur that they should call us right away, patient expressed understanding.  Follow-up in 4 weeks    Call or return if worsening symptoms.    Follow Up   Return in about 4 weeks (around 5/20/2024).  Patient was given instructions and counseling regarding her condition or for health maintenance advice. Please see specific information pulled into the AVS if appropriate.       EMR Dragon/Transcription disclaimer:  Part of this note may be an electronic transcription/translation of spoken language to printed text using the Dragon Dictation System

## 2024-05-20 ENCOUNTER — OFFICE VISIT (OUTPATIENT)
Dept: ORTHOPEDIC SURGERY | Facility: CLINIC | Age: 29
End: 2024-05-20
Payer: COMMERCIAL

## 2024-05-20 VITALS
OXYGEN SATURATION: 97 % | HEIGHT: 68 IN | WEIGHT: 183.2 LBS | DIASTOLIC BLOOD PRESSURE: 67 MMHG | BODY MASS INDEX: 27.77 KG/M2 | HEART RATE: 79 BPM | SYSTOLIC BLOOD PRESSURE: 109 MMHG

## 2024-05-20 DIAGNOSIS — Z47.89 AFTERCARE FOLLOWING SURGERY OF THE MUSCULOSKELETAL SYSTEM: Primary | ICD-10-CM

## 2024-05-20 PROCEDURE — 99024 POSTOP FOLLOW-UP VISIT: CPT | Performed by: PHYSICIAN ASSISTANT

## 2024-05-20 NOTE — PROGRESS NOTES
"Chief Complaint  Pain and Follow-up of the Right Shoulder    Subjective          History of Present Illness      Dalia Lucio is a 28 y.o. female  presents to [facility] for     Patient presents with her  Armond for follow-up evaluation of right shoulder arthroscopic subacromial decompression mini open rotator cuff repair, 4/8/2024.  She presents in her sling she has been compliant with sling use she states the incisions have been healing well and denies redness drainage or dehiscence.  She is attending therapy 3 times a week at \[facility]".  Patient states she is not taking pain medication or NSAIDs she is happy with her progress, for the shoulder.  She states her wrist has been hurting she has some swelling to the right wrist ever since her surgery.  She may want to be seen for this in the future    No Known Allergies     Social History     Socioeconomic History    Marital status:    Tobacco Use    Smoking status: Never     Passive exposure: Never    Smokeless tobacco: Never   Vaping Use    Vaping status: Never Used   Substance and Sexual Activity    Alcohol use: Never    Drug use: Never    Sexual activity: Yes     Partners: Male     Birth control/protection: I.U.D.        REVIEW OF SYSTEMS    Constitutional: Awake alert and oriented x3, no acute distress, denies fevers, chills, weight loss  Respiratory: No respiratory distress  Vascular: Brisk cap refill, Intact distal pulses, No cyanosis, compartments soft with no signs or symptoms of compartment syndrome or DVT.   Cardiovascular: Denies chest pain, shortness of breath  Skin: Denies rashes, acute skin changes  Neurologic: Denies headache, loss of consciousness  MSK: Right shoulder pain      Objective   Vital Signs:   /67   Pulse 79   Ht 172.7 cm (68\")   Wt 83.1 kg (183 lb 3.2 oz)   SpO2 97%   BMI 27.86 kg/m²     Body mass index is 27.86 kg/m².    Physical Exam       Right shoulder: Incisions are healing " well, no erythema, no drainage or dehiscence, no signs of infection, active forward elevation 90 external rotation at side 40 abduction 80, elbow wrist hand range of motion intact/appropriate      Procedures    Imaging Results (Most Recent)       None             Result Review :   The following data was reviewed by: JOSUE Mclean on 05/20/2024:               Assessment and Plan    Diagnoses and all orders for this visit:    1. Aftercare following surgery of right shoulder arthroscopic subacromial decompression, mini open rotator cuff repair, 4/8/2024 (Primary)  -     Ambulatory Referral to Physical Therapy        Discussed diagnosis and treatment options with the patient she was advised to discontinue sling use, continue therapy for for range of motion and strengthening, follow-up in 6 weeks.  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  Patient was advised to avoid heavy lift push pull activities  Call or return if worsening symptoms.    Follow Up   Return in about 6 weeks (around 7/1/2024) for Recheck.  Patient was given instructions and counseling regarding her condition or for health maintenance advice. Please see specific information pulled into the AVS if appropriate.       EMR Dragon/Transcription disclaimer:  Part of this note may be an electronic transcription/translation of spoken language to printed text using the Dragon Dictation System

## 2024-05-21 ENCOUNTER — TELEPHONE (OUTPATIENT)
Dept: ORTHOPEDIC SURGERY | Facility: CLINIC | Age: 29
End: 2024-05-21

## 2024-05-21 NOTE — TELEPHONE ENCOUNTER
Caller: PATIENT    Relationship: SELF    Best call back number: [phone]    What form or medical record are you requesting: WORK RELEASE NOTE    Timeframe paperwork needed: SHE RETURNED TO WORK TODAY 5/21/24    Additional notes: SHE WOULD LIKE TO  NOTE TODAY IF POSSIBLE. PLEASE CALL WHEN COMPLETED

## 2024-06-27 ENCOUNTER — HOSPITAL ENCOUNTER (OUTPATIENT)
Dept: GENERAL RADIOLOGY | Facility: HOSPITAL | Age: 29
Discharge: HOME | End: 2024-06-27
Payer: COMMERCIAL

## 2024-06-27 ENCOUNTER — TRANSCRIBE ORDERS (OUTPATIENT)
Dept: GENERAL RADIOLOGY | Facility: HOSPITAL | Age: 29
End: 2024-06-27
Payer: COMMERCIAL

## 2024-06-27 DIAGNOSIS — R52 PAIN: Primary | ICD-10-CM

## 2024-06-27 DIAGNOSIS — R52 PAIN: ICD-10-CM

## 2024-06-27 PROCEDURE — 73110 X-RAY EXAM OF WRIST: CPT

## 2024-06-27 PROCEDURE — 73130 X-RAY EXAM OF HAND: CPT

## 2024-06-27 PROCEDURE — 73522 X-RAY EXAM HIPS BI 3-4 VIEWS: CPT

## 2024-07-01 ENCOUNTER — OFFICE VISIT (OUTPATIENT)
Dept: ORTHOPEDIC SURGERY | Facility: CLINIC | Age: 29
End: 2024-07-01
Payer: COMMERCIAL

## 2024-07-01 VITALS — HEART RATE: 89 BPM | SYSTOLIC BLOOD PRESSURE: 133 MMHG | OXYGEN SATURATION: 98 % | DIASTOLIC BLOOD PRESSURE: 73 MMHG

## 2024-07-01 DIAGNOSIS — Z47.89 AFTERCARE FOLLOWING SURGERY OF THE MUSCULOSKELETAL SYSTEM: Primary | ICD-10-CM

## 2024-07-01 PROCEDURE — 99024 POSTOP FOLLOW-UP VISIT: CPT | Performed by: PHYSICIAN ASSISTANT

## 2024-07-01 NOTE — PROGRESS NOTES
Chief Complaint  Follow-up of the Right Shoulder    Subjective          History of Present Illness      Dalia Lucio is a 28 y.o. female  presents to [facility] for     Patient presents with her  for follow-up evaluation of right shoulder arthroscopic subacromial decompression, mini open rotator cuff repair, 4/8/2024.  Patient states her pain is controlled she denies need for pain medication or NSAIDs.  She states she gets stiff/sore at times when at rest at work or working at her desk and also at night when she sleeps.  She is attending therapy at [facility].  She states the incisions have healed well.  She states she is able to move her shoulder more and use it more with certain activities but she avoids any heavy lift push pull activity.  She has been on light duty at work.  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    No Known Allergies     Social History     Socioeconomic History    Marital status:    Tobacco Use    Smoking status: Never     Passive exposure: Never    Smokeless tobacco: Never   Vaping Use    Vaping status: Never Used   Substance and Sexual Activity    Alcohol use: Never    Drug use: Never    Sexual activity: Yes     Partners: Male     Birth control/protection: I.U.D.        REVIEW OF SYSTEMS    Constitutional: Awake alert and oriented x3, no acute distress, denies fevers, chills, weight loss  Respiratory: No respiratory distress  Vascular: Brisk cap refill, Intact distal pulses, No cyanosis, compartments soft with no signs or symptoms of compartment syndrome or DVT.   Cardiovascular: Denies chest pain, shortness of breath  Skin: Denies rashes, acute skin changes  Neurologic: Denies headache, loss of consciousness  MSK: Right shoulder pain      Objective   Vital Signs:   /73   Pulse 89   SpO2  98%     There is no height or weight on file to calculate BMI.    Physical Exam       Right shoulder: Well-healed incisions, no erythema, no ecchymosis or swelling, no drainage, no atrophy or signs of infection.  Active forward elevation 130 passive forward elevation 150 active abduction 90 passive abduction 100 external rotation with abduction 40 internal rotation 40, strength appropriate, neurovascularly intact      Procedures    Imaging Results (Most Recent)       None             Result Review :   The following data was reviewed by: JOSUE Mclean on 07/01/2024:               Assessment and Plan    Diagnoses and all orders for this visit:    1. Aftercare following surgery of right shoulder arthroscopic subacromial decompression, mini open rotator cuff repair, 4/8/2024 (Primary)  -     Ambulatory Referral to Physical Therapy        Discussed diagnosis and treatment options with the patient she was advised we recommend continuing therapy she was given new order for this, she was released to full duty no restrictions, she was advised to avoid heavy lift push pull activities follow-up in 6 weeks for recheck    Call or return if worsening symptoms.    Follow Up   Return in about 6 weeks (around 8/12/2024) for Recheck.  Patient was given instructions and counseling regarding her condition or for health maintenance advice. Please see specific information pulled into the AVS if appropriate.       EMR Dragon/Transcription disclaimer:  Part of this note may be an electronic transcription/translation of spoken language to printed text using the Dragon Dictation System

## 2024-08-12 ENCOUNTER — OFFICE VISIT (OUTPATIENT)
Dept: ORTHOPEDIC SURGERY | Facility: CLINIC | Age: 29
End: 2024-08-12
Payer: COMMERCIAL

## 2024-08-12 VITALS
WEIGHT: 185 LBS | HEIGHT: 68 IN | SYSTOLIC BLOOD PRESSURE: 107 MMHG | OXYGEN SATURATION: 98 % | BODY MASS INDEX: 28.04 KG/M2 | HEART RATE: 77 BPM | DIASTOLIC BLOOD PRESSURE: 72 MMHG

## 2024-08-12 DIAGNOSIS — Z47.89 AFTERCARE FOLLOWING SURGERY OF THE MUSCULOSKELETAL SYSTEM: Primary | ICD-10-CM

## 2024-08-12 PROCEDURE — 99213 OFFICE O/P EST LOW 20 MIN: CPT | Performed by: PHYSICIAN ASSISTANT

## 2024-08-12 NOTE — PROGRESS NOTES
"Chief Complaint  Follow-up of the Right Shoulder    Subjective          History of Present Illness      Dalia Lucio is a 28 y.o. female  presents to [facility] for     Patient presents for follow-up evaluation of right shoulder arthroscopic subacromial decompression, mini open rotator cuff repair, 4/8/2024.  Patient states that she is doing well she denies need for pain medication or NSAIDs she states that [facility] [address] and she is happy with her progress.  She denies new injury or symptoms of pain she states that work is going well she states activities of daily living are improving, no new complaints.      No Known Allergies     Social History     Socioeconomic History    Marital status:    Tobacco Use    Smoking status: Never     Passive exposure: Never    Smokeless tobacco: Never   Vaping Use    Vaping status: Never Used   Substance and Sexual Activity    Alcohol use: Never    Drug use: Never    Sexual activity: Yes     Partners: Male     Birth control/protection: I.U.D.     Comment: Getting IUD out monday 08/19/2024        REVIEW OF SYSTEMS    Constitutional: Awake alert and oriented x3, no acute distress, denies fevers, chills, weight loss  Respiratory: No respiratory distress  Vascular: Brisk cap refill, Intact distal pulses, No cyanosis, compartments soft with no signs or symptoms of compartment syndrome or DVT.   Cardiovascular: Denies chest pain, shortness of breath  Skin: Denies rashes, acute skin changes  Neurologic: Denies headache, loss of consciousness  MSK: Right shoulder pain      Objective   Vital Signs:   /72   Pulse 77   Ht 172.7 cm (67.99\")   Wt 83.9 kg (185 lb)   SpO2 98%   BMI 28.14 kg/m²     Body mass index is 28.14 kg/m².    Physical Exam       Right shoulder: Well-healed incisions, no erythema, no ecchymosis or swelling, no signs of infection active forward elevation 160 " active abduction 100 external rotation with abduction 80 internal rotation to T12, elbow wrist hand range of motion intact/appropriate, neurovascularly intact      Procedures    Imaging Results (Most Recent)       None             Result Review :   The following data was reviewed by: JOSUE Mclean on 08/12/2024:               Assessment and Plan    Diagnoses and all orders for this visit:    1. Aftercare following surgery of right shoulder arthroscopic subacromial decompression, mini open rotator cuff repair, 4/8/2024 (Primary)  -     Ambulatory Referral to Physical Therapy for Evaluation & Treatment        Discussed diagnosis and treatment options with the patient patient was advised to continue therapy she was given new order for this, continue activity as tolerated, avoid heavy lift push pull activity follow-up in 6 weeks for recheck    Call or return if worsening symptoms.    Follow Up   Return in about 6 weeks (around 9/23/2024) for Recheck.  Patient was given instructions and counseling regarding her condition or for health maintenance advice. Please see specific information pulled into the AVS if appropriate.       EMR Dragon/Transcription disclaimer:  Part of this note may be an electronic transcription/translation of spoken language to printed text using the Dragon Dictation System

## 2024-08-15 NOTE — PROGRESS NOTES
"Well Woman Visit    CC: Scheduled annual well gyn visit  Chief Complaint   Patient presents with    Annual Exam       HPI:   28 y.o.   Social History     Substance and Sexual Activity   Sexual Activity Yes    Partners: Male    Birth control/protection: I.U.D.    Comment: Getting IUD out 2024       28-year-old female G0 with Mirena IUD here for AP.  Patient desires removal of her IUD.  She is not sure what she desires to use for birth control in the future.  Her last Pap smear was 3 years ago and was negative per her account.  Pt has no complaints today.    PCP: does manage PMHx and preventative labs  History: PMHx, Meds, Allergies, PSHx, Social Hx, and POBHx all reviewed and updated.    PHYSICAL EXAM:  /75   Pulse 86   Ht 172.7 cm (67.99\")   Wt 88.5 kg (195 lb)   Breastfeeding No   BMI 29.66 kg/m²  Not found.  Chaperone present  General- NAD, alert and oriented, appropriate  Psych- Normal mood, good memory  Neck- No masses, no thyroid enlargement  CV- Regular rhythm, no murmurs  Resp- CTA to bases, no wheezes  Abdomen- Soft, non distended, non tender, no masses    Chaperone present during breast and/or pelvic exam if performed.  Breast left-  Bilaterally symmetrical, no masses, non tender, no nipple discharge, no axillary or supraclavicular nodes palpable.    Breast right- Bilaterally symmetrical, no masses, non tender, no nipple discharge, no axillary or supraclavicular nodes palpable.      External genitalia- Normal female, no lesions  Urethra/meatus- Normal, no masses, non tender  Bladder- Normal, no masses, non tender  Vagina- Normal, no atrophy, no lesions, no discharge.      Cvx- Normal, no lesions, no discharge, No cervical motion tenderness, IUD strings visable 1.5cm  Uterus- Normal size, shape & consistency.  Non tender, mobile.  Adnexa- No mass, non tender  Anus/Rectum/Perineum- Not performed    Lymphatic- No palpable neck, axillary, or groin nodes  Ext- No edema, no cyanosis "    Skin- No lesions, no rashes, no acanthosis nigricans  IUD string grasped with Alexsandra tenaculum and the IUD was removed without incident it was intact and was discarded.  Patient tolerated the procedure well.    ASSESSMENT and PLAN:    Diagnoses and all orders for this visit:    1. Well woman exam with routine gynecological exam (Primary)  -     IGP,rfx Aptima HPV All Pth    2. Encounter for IUD removal        Preventative:  BREAST HEALTH- Monthly self breast exam importance and how to reviewed. MMG and/or MRI (prn) reviewed per society guidelines and her individual history. Screen: Not medically needed  CERVICAL CANCER Screening- Reviewed current ASCCP guidelines for screening w and wo cotest HPV, age specific.  Screen: Updated today  COLON CANCER Screening- Reviewed current medical society guidelines and options.  Screen:  Not medically needed  Follow up PCP/Specialist PMHx and/or Labs          She understands the importance of having any ordered tests to be performed in a timely fashion.  The risks of not performing them include, but are not limited to, advanced cancer stages, bone loss from osteoporosis and/or subsequent increase in morbidity and/or mortality.  She is encouraged to review her results online and/or contact or office if she has questions.     Follow Up:  Return in about 1 year (around 8/19/2025) for Annual physical.            Idania Brown, DO  08/19/2024    [facility]  [address] [place] [address]  Dept: [phone]  Dept Fax: [phone]  Loc: [phone]  Loc Fax: [phone]

## 2024-08-19 ENCOUNTER — OFFICE VISIT (OUTPATIENT)
Dept: OBSTETRICS AND GYNECOLOGY | Facility: CLINIC | Age: 29
End: 2024-08-19
Payer: COMMERCIAL

## 2024-08-19 VITALS
HEART RATE: 86 BPM | HEIGHT: 68 IN | BODY MASS INDEX: 29.55 KG/M2 | DIASTOLIC BLOOD PRESSURE: 75 MMHG | SYSTOLIC BLOOD PRESSURE: 120 MMHG | WEIGHT: 195 LBS

## 2024-08-19 DIAGNOSIS — Z30.432 ENCOUNTER FOR IUD REMOVAL: ICD-10-CM

## 2024-08-19 DIAGNOSIS — Z01.419 WELL WOMAN EXAM WITH ROUTINE GYNECOLOGICAL EXAM: Primary | ICD-10-CM

## 2024-08-19 PROCEDURE — 99459 PELVIC EXAMINATION: CPT | Performed by: OBSTETRICS & GYNECOLOGY

## 2024-08-19 PROCEDURE — 58301 REMOVE INTRAUTERINE DEVICE: CPT | Performed by: OBSTETRICS & GYNECOLOGY

## 2024-08-19 PROCEDURE — G0123 SCREEN CERV/VAG THIN LAYER: HCPCS | Performed by: OBSTETRICS & GYNECOLOGY

## 2024-08-19 PROCEDURE — 99385 PREV VISIT NEW AGE 18-39: CPT | Performed by: OBSTETRICS & GYNECOLOGY

## 2024-08-22 LAB
CONV .: NORMAL
CYTOLOGIST CVX/VAG CYTO: NORMAL
CYTOLOGY CVX/VAG DOC CYTO: NORMAL
CYTOLOGY CVX/VAG DOC THIN PREP: NORMAL
DX ICD CODE: NORMAL
Lab: NORMAL
OTHER STN SPEC: NORMAL
STAT OF ADQ CVX/VAG CYTO-IMP: NORMAL

## 2024-09-11 ENCOUNTER — OFFICE VISIT (OUTPATIENT)
Dept: SURGERY | Facility: CLINIC | Age: 29
End: 2024-09-11
Payer: COMMERCIAL

## 2024-09-11 VITALS — RESPIRATION RATE: 16 BRPM | HEIGHT: 68 IN | BODY MASS INDEX: 30.37 KG/M2 | WEIGHT: 200.4 LBS

## 2024-09-11 DIAGNOSIS — K82.4 GALLBLADDER POLYP: ICD-10-CM

## 2024-09-11 DIAGNOSIS — R14.0 BLOATING: Primary | ICD-10-CM

## 2024-09-11 NOTE — LETTER
September 12, 2024       No Recipients    Patient: Dalia Lucio   YOB: 1995   Date of Visit: 9/11/2024     Dear JANINE Carrillo:       Thank you for referring Dalia Lucio to me for evaluation. Below are the relevant portions of my assessment and plan of care.    If you have questions, please do not hesitate to call me. I look forward to following Dalia along with you.         Sincerely,        Antonio Doe MD        CC:   No Recipients    Antonio Doe MD  09/12/24 0814  Sign when Signing Visit  General Surgery/Colorectal Surgery Note    Patient Name:  Dalia Lucio  YOB: 1995  [id number]    Referring Provider: Flori Lester APRN      Patient Care Team:  Flori Lester APRN as PCP - General (Family Medicine)    Chief complaint abdominal pain    Subjective.     History of present illness:    History of some intermittent central sharp abdominal pain with no relationship to food.  Long history of constipation with bloating.  No previous upper or lower endoscopy.  Ultrasound right upper quadrant at outside facility with questionable gallstone versus gallbladder polyp 6 x 4 mm.  No previous abdominal surgery.  No history of pancreatitis.  No weight loss.  No fiber use.  No blood thinner use.      History:  Past Medical History:   Diagnosis Date   • Anxiety    • Depression    • Rotator cuff tear     RIGHT       Past Surgical History:   Procedure Laterality Date   • KNEE ARTHROSCOPY Left     X2 ACL/MENISCUS REPAIR   • SHOULDER ARTHROSCOPY W/ ROTATOR CUFF REPAIR Right 4/8/2024    Procedure: right SHOULDER ARTHROSCOPY, MINI OPEN ROTATOR CUFF REPAIR SUBACROMIAL DECOMPRESSION;  Surgeon: Salvador Cancino MD;  Location: [facility] OR [facility] [place];  Service: Orthopedics;  Laterality: Right;       Family History   Problem Relation Age of Onset   • Colon cancer Paternal Grandmother    • Malig Hyperthermia Neg Hx    • Ovarian cancer Neg Hx    •  Uterine cancer Neg Hx    • Breast cancer Neg Hx    • Prostate cancer Neg Hx    • Deep vein thrombosis Neg Hx    • Pulmonary embolism Neg Hx        Social History     Tobacco Use   • Smoking status: Never     Passive exposure: Never   • Smokeless tobacco: Never   Vaping Use   • Vaping status: Never Used   Substance Use Topics   • Alcohol use: Never   • Drug use: Never       Review of Systems  All systems were reviewed and negative except for:   Review of Systems   Constitutional: Negative for chills, fever and unexpected weight loss.   HENT: Negative for congestion, nosebleeds and voice change.    Eyes: Negative for blurred vision, double vision and discharge.   Respiratory: Negative for apnea, chest tightness and shortness of breath.    Cardiovascular: Negative for chest pain and leg swelling.   Gastrointestinal:        See HPI   Endocrine: Negative for cold intolerance and heat intolerance.   Genitourinary: Negative for dysuria, hematuria and urgency.   Musculoskeletal: Negative for back pain, joint swelling and neck pain.   Skin: Negative for color change and dry skin.   Neurological: Negative for dizziness and confusion.   Hematological: Negative for adenopathy.   Psychiatric/Behavioral: Negative for agitation and behavioral problems.     MEDS:  Prior to Admission medications    Medication Sig Start Date End Date Taking? Authorizing Provider   IUD'S IU 1 Intra Uterine Device by Intrauterine route Take As Directed.  Patient not taking: Reported on 8/19/2024    Provider, Jassi, MD        Allergies:  Patient has no known allergies.    Objective    Vital Signs        Physical Exam:     General Appearance:    Alert, cooperative, in no acute distress   Head:    Normocephalic, without obvious abnormality, atraumatic   Eyes:          Conjunctivae and sclerae normal, no icterus,     Ears:    Ears appear intact with no abnormalities noted   Throat:   No oral lesions, no thrush, oral mucosa moist   Neck:   No  "adenopathy, supple, trachea midline, no thyromegaly   Back:     No kyphosis present, no scoliosis present, no skin lesions,      erythema or scars, no tenderness to percussion or                   palpation,   range of motion normal   Lungs:     Clear to auscultation,respirations regular, even and                  unlabored    Heart:    Regular rhythm and normal rate, normal S1 and S2, no            murmur, no gallop, no rub, no click   Chest Wall:    No abnormalities observed   Abdomen:     Normal bowel sounds, no masses, no organomegaly, soft        non-tender, non-distended, no guarding, no rebound                tenderness   Rectal:        Extremities:   Moves all extremities well, no edema, no cyanosis, no             redness   Pulses:   Pulses palpable and equal bilaterally   Skin:   No bleeding, bruising or rash   Lymph nodes:   No palpable adenopathy   Neurologic:   A/o x 4 with no deficits       Results Review:   {Results Review:53636::\"I reviewed the patient's new clinical results.\"    LABS/IMAGING:  Results for orders placed or performed in visit on 08/19/24   IGP,rfx Aptima HPV All Pth    Specimen: Cervix; ThinPrep Vial   Result Value Ref Range    Diagnosis Comment     Specimen adequacy: Comment     Clinician Provided ICD-10: Comment     Performed by: Comment     . .     Note: Comment     Method: Comment     Conv .conv Comment         Result Review:     Assessment & Plan    Cholelithiasis versus gallbladder polyp  History of constipation, abdominal bloating, questionable IBS    Discussion with patient.  I reviewed her ultrasound with results mentioned above.  I do not recommend cholecystectomy at this time.  I recommended rechecking an ultrasound of her gallbladder in 6 months.  Orders placed.  I offered upper and lower endoscopy for further evaluation of her symptoms and she declined.  I offered referral to GI and she agreed.  Orders placed.  All questions answered.  She agrees with the plan.  Thank you " for the consult.              This document has been electronically signed by Antonio Doe MD  September 11, 2024 15:33 EDT

## 2024-09-11 NOTE — PROGRESS NOTES
General Surgery/Colorectal Surgery Note    Patient Name:  Dalia Lucio  YOB: 1995  [id number]    Referring Provider: Flori Lester APRN      Patient Care Team:  Flori Lester APRN as PCP - General (Family Medicine)    Chief complaint abdominal pain    Subjective .     History of present illness:    History of some intermittent central sharp abdominal pain with no relationship to food.  Long history of constipation with bloating.  No previous upper or lower endoscopy.  Ultrasound right upper quadrant at outside facility with questionable gallstone versus gallbladder polyp 6 x 4 mm.  No previous abdominal surgery.  No history of pancreatitis.  No weight loss.  No fiber use.  No blood thinner use.      History:  Past Medical History:   Diagnosis Date    Anxiety     Depression     Rotator cuff tear     RIGHT       Past Surgical History:   Procedure Laterality Date    KNEE ARTHROSCOPY Left     X2 ACL/MENISCUS REPAIR    SHOULDER ARTHROSCOPY W/ ROTATOR CUFF REPAIR Right 4/8/2024    Procedure: right SHOULDER ARTHROSCOPY, MINI OPEN ROTATOR CUFF REPAIR SUBACROMIAL DECOMPRESSION;  Surgeon: Salvador Cancino MD;  [place] [facility] [place] [facility] [place]  Service: [place]opedics;  Laterality: Right;       Family History   Problem Relation Age of Onset    Colon cancer Paternal Grandmother     Malig Hyperthermia Neg Hx     Ovarian cancer Neg Hx     Uterine cancer Neg Hx     Breast cancer Neg Hx     Prostate cancer Neg Hx     Deep vein thrombosis Neg Hx     Pulmonary embolism Neg Hx        Social History     Tobacco Use    Smoking status: Never     Passive exposure: Never    Smokeless tobacco: Never   Vaping Use    Vaping status: Never Used   Substance Use Topics    Alcohol use: Never    Drug use: Never       Review of Systems  All systems were reviewed and negative except for:   Review of Systems   Constitutional: Negative for chills, fever and unexpected weight loss.   HENT: Negative for congestion, nosebleeds  and voice change.    Eyes: Negative for blurred vision, double vision and discharge.   Respiratory: Negative for apnea, chest tightness and shortness of breath.    Cardiovascular: Negative for chest pain and leg swelling.   Gastrointestinal:        See HPI   Endocrine: Negative for cold intolerance and heat intolerance.   Genitourinary: Negative for dysuria, hematuria and urgency.   Musculoskeletal: Negative for back pain, joint swelling and neck pain.   Skin: Negative for color change and dry skin.   Neurological: Negative for dizziness and confusion.   Hematological: Negative for adenopathy.   Psychiatric/Behavioral: Negative for agitation and behavioral problems.     MEDS:  Prior to Admission medications    Medication Sig Start Date End Date Taking? Authorizing Provider   IUD'S IU 1 Intra Uterine Device by Intrauterine route Take As Directed.  Patient not taking: Reported on 8/19/2024    Provider, Historical, MD        Allergies:  Patient has no known allergies.    Objective     Vital Signs        Physical Exam:     General Appearance:    Alert, cooperative, in no acute distress   Head:    Normocephalic, without obvious abnormality, atraumatic   Eyes:          Conjunctivae and sclerae normal, no icterus,     Ears:    Ears appear intact with no abnormalities noted   Throat:   No oral lesions, no thrush, oral mucosa moist   Neck:   No adenopathy, supple, trachea midline, no thyromegaly   Back:     No kyphosis present, no scoliosis present, no skin lesions,      erythema or scars, no tenderness to percussion or                   palpation,   range of motion normal   Lungs:     Clear to auscultation,respirations regular, even and                  unlabored    Heart:    Regular rhythm and normal rate, normal S1 and S2, no            murmur, no gallop, no rub, no click   Chest Wall:    No abnormalities observed   Abdomen:     Normal bowel sounds, no masses, no organomegaly, soft        non-tender, non-distended, no  "guarding, no rebound                tenderness   Rectal:        Extremities:   Moves all extremities well, no edema, no cyanosis, no             redness   Pulses:   Pulses palpable and equal bilaterally   Skin:   No bleeding, bruising or rash   Lymph nodes:   No palpable adenopathy   Neurologic:   A/o x 4 with no deficits       Results Review:   {Results Review:25586::\"I reviewed the patient's new clinical results.\"    LABS/IMAGING:  Results for orders placed or performed in visit on 08/19/24   IGP,rfx Aptima HPV All Pth    Specimen: Cervix; ThinPrep Vial   Result Value Ref Range    Diagnosis Comment     Specimen adequacy: Comment     Clinician Provided ICD-10: Comment     Performed by: Comment     . .     Note: Comment     Method: Comment     Conv .conv Comment         Result Review :     Assessment & Plan     Cholelithiasis versus gallbladder polyp  History of constipation, abdominal bloating, questionable IBS    Discussion with patient.  I reviewed her ultrasound with results mentioned above.  I do not recommend cholecystectomy at this time.  I recommended rechecking an ultrasound of her gallbladder in 6 months.  Orders placed.  I offered upper and lower endoscopy for further evaluation of her symptoms and she declined.  I offered referral to GI and she agreed.  Orders placed.  All questions answered.  She agrees with the plan.  Thank you for the consult.              This document has been electronically signed by Antonio Doe MD  September 11, 2024 15:33 EDT  "

## 2024-09-23 ENCOUNTER — OFFICE VISIT (OUTPATIENT)
Dept: ORTHOPEDIC SURGERY | Facility: CLINIC | Age: 29
End: 2024-09-23
Payer: COMMERCIAL

## 2024-09-23 VITALS
BODY MASS INDEX: 30.31 KG/M2 | HEIGHT: 68 IN | WEIGHT: 200 LBS | OXYGEN SATURATION: 97 % | DIASTOLIC BLOOD PRESSURE: 75 MMHG | HEART RATE: 74 BPM | SYSTOLIC BLOOD PRESSURE: 110 MMHG

## 2024-09-23 DIAGNOSIS — Z47.89 AFTERCARE FOLLOWING SURGERY OF THE MUSCULOSKELETAL SYSTEM: Primary | ICD-10-CM

## 2024-09-23 PROCEDURE — 99213 OFFICE O/P EST LOW 20 MIN: CPT | Performed by: PHYSICIAN ASSISTANT

## 2024-11-12 ENCOUNTER — HOSPITAL ENCOUNTER (OUTPATIENT)
Dept: GENERAL RADIOLOGY | Facility: HOSPITAL | Age: 29
Discharge: HOME | End: 2024-11-12
Payer: COMMERCIAL

## 2024-11-12 ENCOUNTER — TRANSCRIBE ORDERS (OUTPATIENT)
Dept: ADMINISTRATIVE | Facility: HOSPITAL | Age: 29
End: 2024-11-12
Payer: COMMERCIAL

## 2024-11-12 DIAGNOSIS — M79.18 DIFFUSE MYOFASCIAL PAIN SYNDROME: ICD-10-CM

## 2024-11-12 DIAGNOSIS — M54.9 DORSALGIA: ICD-10-CM

## 2024-11-12 DIAGNOSIS — M79.18 DIFFUSE MYOFASCIAL PAIN SYNDROME: Primary | ICD-10-CM

## 2024-11-12 DIAGNOSIS — M54.2 CERVICALGIA: ICD-10-CM

## 2024-11-12 DIAGNOSIS — M54.50 LOW BACK PAIN, UNSPECIFIED BACK PAIN LATERALITY, UNSPECIFIED CHRONICITY, UNSPECIFIED WHETHER SCIATICA PRESENT: ICD-10-CM

## 2024-11-12 PROCEDURE — 72040 X-RAY EXAM NECK SPINE 2-3 VW: CPT

## 2024-11-12 PROCEDURE — 72100 X-RAY EXAM L-S SPINE 2/3 VWS: CPT

## 2024-11-12 PROCEDURE — 72070 X-RAY EXAM THORAC SPINE 2VWS: CPT

## 2025-01-08 DIAGNOSIS — Z11.1 SCREENING-PULMONARY TB: ICD-10-CM

## 2025-01-08 DIAGNOSIS — Z79.4 ENCOUNTER FOR LONG-TERM (CURRENT) USE OF INSULIN: Primary | ICD-10-CM

## 2025-01-31 ENCOUNTER — HOSPITAL ENCOUNTER (OUTPATIENT)
Dept: GENERAL RADIOLOGY | Facility: HOSPITAL | Age: 30
Discharge: HOME | End: 2025-01-31
Payer: COMMERCIAL

## 2025-01-31 ENCOUNTER — LAB (OUTPATIENT)
Dept: LAB | Facility: HOSPITAL | Age: 30
End: 2025-01-31
Payer: COMMERCIAL

## 2025-01-31 DIAGNOSIS — Z79.4 ENCOUNTER FOR LONG-TERM (CURRENT) USE OF INSULIN: ICD-10-CM

## 2025-01-31 DIAGNOSIS — Z11.1 SCREENING-PULMONARY TB: ICD-10-CM

## 2025-01-31 LAB
ALBUMIN SERPL-MCNC: 4.1 G/DL (ref 3.5–5.2)
ALBUMIN/GLOB SERPL: 1.4 G/DL
ALP SERPL-CCNC: 113 U/L (ref 39–117)
ALT SERPL W P-5'-P-CCNC: 17 U/L (ref 1–33)
ANION GAP SERPL CALCULATED.3IONS-SCNC: 11 MMOL/L (ref 5–15)
AST SERPL-CCNC: 13 U/L (ref 1–32)
BASOPHILS # BLD AUTO: 0.05 10*3/MM3 (ref 0–0.2)
BASOPHILS NFR BLD AUTO: 0.5 % (ref 0–1.5)
BILIRUB SERPL-MCNC: <0.2 MG/DL (ref 0–1.2)
BUN SERPL-MCNC: 14 MG/DL (ref 6–20)
BUN/CREAT SERPL: 15.1 (ref 7–25)
CALCIUM SPEC-SCNC: 9.2 MG/DL (ref 8.6–10.5)
CHLORIDE SERPL-SCNC: 100 MMOL/L (ref 98–107)
CO2 SERPL-SCNC: 28 MMOL/L (ref 22–29)
CREAT SERPL-MCNC: 0.93 MG/DL (ref 0.57–1)
CRP SERPL-MCNC: 0.77 MG/DL (ref 0–0.5)
DEPRECATED RDW RBC AUTO: 39.6 FL (ref 37–54)
EGFRCR SERPLBLD CKD-EPI 2021: 85 ML/MIN/1.73
EOSINOPHIL # BLD AUTO: 0.26 10*3/MM3 (ref 0–0.4)
EOSINOPHIL NFR BLD AUTO: 2.5 % (ref 0.3–6.2)
ERYTHROCYTE [DISTWIDTH] IN BLOOD BY AUTOMATED COUNT: 12.4 % (ref 12.3–15.4)
GLOBULIN UR ELPH-MCNC: 2.9 GM/DL
GLUCOSE SERPL-MCNC: 107 MG/DL (ref 65–99)
HBV SURFACE AG SERPL QL IA: NORMAL
HCT VFR BLD AUTO: 41 % (ref 34–46.6)
HGB BLD-MCNC: 13.6 G/DL (ref 12–15.9)
IMM GRANULOCYTES # BLD AUTO: 0.08 10*3/MM3 (ref 0–0.05)
IMM GRANULOCYTES NFR BLD AUTO: 0.8 % (ref 0–0.5)
LYMPHOCYTES # BLD AUTO: 2.64 10*3/MM3 (ref 0.7–3.1)
LYMPHOCYTES NFR BLD AUTO: 25.6 % (ref 19.6–45.3)
MCH RBC QN AUTO: 29.1 PG (ref 26.6–33)
MCHC RBC AUTO-ENTMCNC: 33.2 G/DL (ref 31.5–35.7)
MCV RBC AUTO: 87.8 FL (ref 79–97)
MONOCYTES # BLD AUTO: 0.8 10*3/MM3 (ref 0.1–0.9)
MONOCYTES NFR BLD AUTO: 7.8 % (ref 5–12)
NEUTROPHILS NFR BLD AUTO: 6.48 10*3/MM3 (ref 1.7–7)
NEUTROPHILS NFR BLD AUTO: 62.8 % (ref 42.7–76)
NRBC BLD AUTO-RTO: 0 /100 WBC (ref 0–0.2)
PLATELET # BLD AUTO: 314 10*3/MM3 (ref 140–450)
PMV BLD AUTO: 9.5 FL (ref 6–12)
POTASSIUM SERPL-SCNC: 3.4 MMOL/L (ref 3.5–5.2)
PROT SERPL-MCNC: 7 G/DL (ref 6–8.5)
RBC # BLD AUTO: 4.67 10*6/MM3 (ref 3.77–5.28)
SODIUM SERPL-SCNC: 139 MMOL/L (ref 136–145)
WBC NRBC COR # BLD AUTO: 10.31 10*3/MM3 (ref 3.4–10.8)

## 2025-01-31 PROCEDURE — 81374 HLA I TYPING 1 ANTIGEN LR: CPT

## 2025-01-31 PROCEDURE — 80053 COMPREHEN METABOLIC PANEL: CPT

## 2025-01-31 PROCEDURE — 71046 X-RAY EXAM CHEST 2 VIEWS: CPT

## 2025-01-31 PROCEDURE — 87340 HEPATITIS B SURFACE AG IA: CPT

## 2025-01-31 PROCEDURE — 86704 HEP B CORE ANTIBODY TOTAL: CPT

## 2025-01-31 PROCEDURE — 85025 COMPLETE CBC W/AUTO DIFF WBC: CPT

## 2025-01-31 PROCEDURE — 86140 C-REACTIVE PROTEIN: CPT

## 2025-01-31 PROCEDURE — 36415 COLL VENOUS BLD VENIPUNCTURE: CPT

## 2025-02-01 LAB — HBV CORE AB SERPL QL IA: NEGATIVE

## 2025-02-04 DIAGNOSIS — Z11.1 SCREENING FOR TUBERCULOSIS: ICD-10-CM

## 2025-02-04 DIAGNOSIS — Z79.4 ENCOUNTER FOR LONG-TERM (CURRENT) USE OF INSULIN: Primary | ICD-10-CM

## 2025-02-05 LAB — HLA-B27 QL NAA+PROBE: NEGATIVE

## 2025-02-28 ENCOUNTER — OFFICE VISIT (OUTPATIENT)
Dept: GASTROENTEROLOGY | Facility: CLINIC | Age: 30
End: 2025-02-28
Payer: COMMERCIAL

## 2025-02-28 VITALS
OXYGEN SATURATION: 100 % | SYSTOLIC BLOOD PRESSURE: 130 MMHG | BODY MASS INDEX: 31.83 KG/M2 | WEIGHT: 210 LBS | DIASTOLIC BLOOD PRESSURE: 72 MMHG | HEART RATE: 81 BPM | HEIGHT: 68 IN

## 2025-02-28 DIAGNOSIS — R14.0 ABDOMINAL BLOATING: Primary | ICD-10-CM

## 2025-02-28 DIAGNOSIS — K21.9 GASTROESOPHAGEAL REFLUX DISEASE, UNSPECIFIED WHETHER ESOPHAGITIS PRESENT: ICD-10-CM

## 2025-02-28 DIAGNOSIS — R14.2 BELCHING: ICD-10-CM

## 2025-02-28 DIAGNOSIS — R07.9 CHEST PAIN, UNSPECIFIED TYPE: ICD-10-CM

## 2025-02-28 PROCEDURE — 99204 OFFICE O/P NEW MOD 45 MIN: CPT | Performed by: NURSE PRACTITIONER

## 2025-02-28 RX ORDER — FOLIC ACID 1 MG/1
TABLET ORAL
COMMUNITY
Start: 2025-02-04

## 2025-02-28 RX ORDER — METHOTREXATE 2.5 MG/1
TABLET ORAL
COMMUNITY
Start: 2025-02-04

## 2025-02-28 RX ORDER — PANTOPRAZOLE SODIUM 40 MG/1
40 TABLET, DELAYED RELEASE ORAL DAILY
Qty: 30 TABLET | Refills: 5 | Status: SHIPPED | OUTPATIENT
Start: 2025-02-28

## 2025-02-28 NOTE — PROGRESS NOTES
Chief Complaint        Burping  and Bloated    Patient or patient representative verbalized consent for the use of Ambient Listening during the visit with  JANINE Christian for chart documentation. 2/28/2025  15:35 EST    History of Present Illness      Dalia Lucio is a 29 y.o. female who presents to [facility] as a new patient   History of Present Illness  The patient is a 29-year-old female presenting to the office today as a new patient.    She reports experiencing a sensation of tightness in her chest, described as a pressure-like feeling, which occurs consistently after eating. This sensation is accompanied by frequent belching, regardless of the type of food consumed. She does not experience any difficulty in swallowing or taking deep breaths during these episodes. There are no associated symptoms of nausea, vomiting, or pain. She also reports intermittent abdominal pain, characterized as stabbing in nature, but notes that it is not persistent or frequent. Daily belching is reported, along with occasional acid reflux, which can be severe enough to cause a burning sensation and subsequent dry cough. These symptoms have been present for the past 4 to 6 months. She also reports symptoms of bloating, inflammation, and swelling. She has never undergone an upper endoscopy or EGD. She is not currently on any blood thinners. She has been taking Pepcid for the past month without any noticeable improvement.    Supplemental Information  She started methotrexate 1.5 months ago for psoriatic arthritis. She is seeing Dr. Long, rheumatologist, for this condition.    SOCIAL HISTORY  She does not smoke.    FAMILY HISTORY  Her paternal grandmother had colon cancer.    MEDICATIONS  Current: Methotrexate, Pepcid          Results       Result Review :   The following data was reviewed by: JANINE Christian on 02/28/2025     CMP          1/31/2025    15:18   CMP   Glucose 107    BUN 14   "  Creatinine 0.93    EGFR 85.0    Sodium 139    Potassium 3.4    Chloride 100    Calcium 9.2    Total Protein 7.0    Albumin 4.1    Globulin 2.9    Total Bilirubin <0.2    Alkaline Phosphatase 113    AST (SGOT) 13    ALT (SGPT) 17    Albumin/Globulin Ratio 1.4    BUN/Creatinine Ratio 15.1    Anion Gap 11.0      CBC          1/31/2025    15:18   CBC   WBC 10.31    RBC 4.67    Hemoglobin 13.6    Hematocrit 41.0    MCV 87.8    MCH 29.1    MCHC 33.2    RDW 12.4    Platelets 314        Iron Profile No results found for: \"IRON\", \"TIBC\", \"LABIRON\", \"TRANSFERRIN\"  Ferritin No results found for: \"FERRITIN\"         Results          Past Medical History       Past Medical History:   Diagnosis Date    Anxiety     Depression     Rotator cuff tear     RIGHT       Past Surgical History:   Procedure Laterality Date    KNEE ARTHROSCOPY Left     X2 ACL/MENISCUS REPAIR    SHOULDER ARTHROSCOPY W/ ROTATOR CUFF REPAIR Right 4/8/2024    Procedure: right SHOULDER ARTHROSCOPY, MINI OPEN ROTATOR CUFF REPAIR SUBACROMIAL DECOMPRESSION;  Surgeon: Salvador Cancino MD;  [facility] [facility] [place] [facility] [place]  Service: Orthopedics;  Laterality: Right;         Current Outpatient Medications:     folic acid (FOLVITE) 1 MG tablet, , Disp: , Rfl:     methotrexate 2.5 MG tablet, , Disp: , Rfl:     pantoprazole (PROTONIX) 40 MG EC tablet, Take 1 tablet by mouth Daily., Disp: 30 tablet, Rfl: 5     No Known Allergies    Family History   Problem Relation Age of Onset    Colon cancer Paternal Grandmother     Malig Hyperthermia Neg Hx     Ovarian cancer Neg Hx     Uterine cancer Neg Hx     Breast cancer Neg Hx     Prostate cancer Neg Hx     Deep vein thrombosis Neg Hx     Pulmonary embolism Neg Hx         Social History     Social History Narrative    Not on file       Objective       Objective     Vital Signs:   /72 (BP Location: Left arm, Patient Position: Sitting, Cuff Size: Adult)   Pulse 81   Ht 172.7 cm (67.99\")   Wt 95.3 kg (210 lb)   SpO2 100%   BMI " 31.94 kg/m²     Body mass index is 31.94 kg/m².    Physical Exam  Constitutional:       Appearance: Normal appearance.   Pulmonary:      Effort: Pulmonary effort is normal.   Neurological:      General: No focal deficit present.      Mental Status: She is alert and oriented to person, place, and time.   Psychiatric:         Mood and Affect: Mood normal.         Behavior: Behavior normal.         Physical Exam                 Assessment & Plan          Assessment and Plan    Diagnoses and all orders for this visit:    1. Abdominal bloating (Primary)  -     Case Request; Standing  -     Follow Anesthesia Guidelines / Protocol; Future  -     Verify NPO; Standing  -     Follow Anesthesia Guidelines / Protocol; Standing  -     Case Request    2. Gastroesophageal reflux disease, unspecified whether esophagitis present  -     Case Request; Standing  -     Follow Anesthesia Guidelines / Protocol; Future  -     Verify NPO; Standing  -     Follow Anesthesia Guidelines / Protocol; Standing  -     Case Request    3. Belching  -     Case Request; Standing  -     Follow Anesthesia Guidelines / Protocol; Future  -     Verify NPO; Standing  -     Follow Anesthesia Guidelines / Protocol; Standing  -     Case Request    4. Chest pain, unspecified type  -     Case Request; Standing  -     Follow Anesthesia Guidelines / Protocol; Future  -     Verify NPO; Standing  -     Follow Anesthesia Guidelines / Protocol; Standing  -     Case Request    Other orders  -     pantoprazole (PROTONIX) 40 MG EC tablet; Take 1 tablet by mouth Daily.  Dispense: 30 tablet; Refill: 5          Assessment & Plan    She reports experiencing chest tightness and pressure immediately after starting to eat, accompanied by frequent belching. There is no associated pain, nausea, vomiting, or difficulty swallowing. She has tried Pepcid for the last month without any improvement. An upper endoscopy (EGD) is recommended to evaluate the esophagus and stomach for  potential issues such as esophagitis, ulcers, or bacterial infections. The procedure will be performed by Dr. Heck at the hospital. She is advised to arrange for a  due to the sedation required for the procedure. She is instructed to fast from midnight the day before the procedure. A prescription for Protonix 40 mg to be taken first thing in the morning has been sent to [facility] pharmacy.    I have recommended that the patient undergo further evaluation with an EGD.  I have discussed this procedure in detail with the patient.  I have discussed the risks, benefits and alternatives.  I have discussed the risk of anesthesia, bleeding and perforation.  Patient understands these risks, benefits and alternatives and wishes to proceed.  I will schedule her at her earliest convenience.          Follow Up       Follow Up   Return for Follow up after endoscopy in office.  Patient was given instructions and counseling regarding her condition or for health maintenance advice. Please see specific information pulled into the AVS if appropriate.

## 2025-02-28 NOTE — PATIENT INSTRUCTIONS
GERD in Adults: What to Know    Gastroesophageal reflux (BRIANA) is when acid from your stomach flows up into your esophagus. Your esophagus is the part of your body that moves food from your mouth to your stomach. Normally, food goes down and stays in your stomach to be digested. But with BRIANA, food and stomach acid may go back up.  You may have a disease called gastroesophageal reflux disease (GERD) if the reflux:  Happens often.  Causes very bad symptoms.  Makes your esophagus sore and swollen.  Over time, GERD can make small holes called ulcers in the lining of your esophagus.  What are the causes?  GERD is caused by a problem with the muscle between your esophagus and stomach. This muscle is called the lower esophageal sphincter (LES). When it's weak or not normal, it doesn't close like it should. This means food and stomach acid can go back up into your esophagus.  The muscle can be weak if:  You smoke or use products with tobacco in them.  You're pregnant.  You have a type of hernia called a hiatal hernia.  You eat certain foods and drinks. These include:  Alcohol.  Coffee.  Chocolate.  Onions.  Peppermint.  What increases the risk?  Being overweight.  Having a disease that affects your connective tissue.  Taking NSAIDs, such as ibuprofen.  What are the signs or symptoms?  Heartburn.  Trouble swallowing.  Pain when you swallow.  The feeling of having a lump in your throat.  A bitter taste in your mouth.  Bad breath.  Having an upset or bloated stomach.  Burping.  Chest pain. Other conditions can also cause chest pain. Make sure you see your health care provider if you have chest pain.  Wheezing. This is when you make high-pitched whistling sounds when you breathe, most often when you breathe out.  A long-term cough or a cough at night.  How is this diagnosed?  GERD may be diagnosed based on your medical history and a physical exam. You may also have tests. These may include:  An endoscopy. This test looks at your  stomach and esophagus with a small camera.  A barium swallow test. This shows the shape and size of your esophagus and how well it's working.  Tests of your esophagus to check for:  Acid levels.  Pressure.  How is this treated?  Treatment may depend on how bad your symptoms are. It may include:  Changes to your diet and daily life.  Medicines.  Surgery.  Follow these instructions at home:  Eating and drinking  Follow an eating plan as told by your provider.  You may need to avoid certain foods and drinks. These may include:  Coffee and tea, with or without caffeine.  Alcohol.  Energy drinks and sports drinks.  Fizzy drinks or sodas.  Chocolate and cocoa.  Peppermint and mint flavorings.  Garlic and onions.  Horseradish.  Spicy and acidic foods. These include:  Peppers.  Chili powder and ayers powder.  Vinegar.  Hot sauces and BBQ sauce.  Citrus fruits and juices. These include:  Oranges.  Jony.  Limes.  Tomato-based foods. These include:  Red sauce and pizza with red sauce.  Chili.  Salsa.  Fried and fatty foods. These include:  Donuts.  French fries.  Potato chips.  High-fat dressings.  High-fat meats. These include:  Hot dogs and sausage.  Rib eye steak.  Ham and cruz.  High-fat dairy items. These include:  Whole milk.  Butter.  Cream cheese.  Eat small meals often. Avoid eating big meals.  Avoid drinking lots of liquid with your meals.  Try not to eat meals during the 2-3 hours before bedtime.  Try not to lie down right after you eat.  Do not exercise right after you eat.  Lifestyle    If you're overweight, lose an amount of weight that's healthy for you. Ask your provider about a safe weight loss goal.  Do not smoke, vape, or use nicotine or tobacco.  Wear loose clothes. Do not wear things that are tight around your waist.  When you sleep, try:  Raising the head of your bed about 6 inches (15 cm). You can use a wedge to do this.  Lying down on your left side.  Try to lower your stress. If you need help doing  this, ask your provider.  General instructions  Take your medicines only as told.  Do not take aspirin or ibuprofen unless you're told to.  Watch for any changes in your symptoms.  Do not bend over if it makes your symptoms worse.  Contact a health care provider if:  You have new symptoms.  You have trouble:  Drinking.  Swallowing.  Eating.  It hurts to swallow.  You have wheezing.  You have a cough that won't go away.  Your voice is hoarse.  Your symptoms don't get better with treatment.  Get help right away if:  You have pain all of a sudden in your:  Arm.  Neck.  Jaw.  Teeth.  Back.  You feel sweaty, dizzy, or light-headed all of a sudden.  You faint.  You have chest pain or shortness of breath.  You vomit and the vomit is:  Green, yellow, or black.  Looks like blood or coffee grounds.  Your poop is red, bloody, or black.  These symptoms may be an emergency. Call 911 right away.  Do not wait to see if the symptoms will go away.  Do not drive yourself to the hospital.  This information is not intended to replace advice given to you by your health care provider. Make sure you discuss any questions you have with your health care provider.  Document Revised: 10/29/2024 Document Reviewed: 05/15/2024  Elsevier Patient Education © 2024 [employer]

## 2025-02-28 NOTE — H&P (VIEW-ONLY)
Chief Complaint        Burping  and Bloated    Patient or patient representative verbalized consent for the use of Ambient Listening during the visit with  JANINE Christian for chart documentation. 2/28/2025  15:35 EST    History of Present Illness      Dalia Lucio is a 29 y.o. female who presents to [facility] as a new patient   History of Present Illness  The patient is a 29-year-old female presenting to the office today as a new patient.    She reports experiencing a sensation of tightness in her chest, described as a pressure-like feeling, which occurs consistently after eating. This sensation is accompanied by frequent belching, regardless of the type of food consumed. She does not experience any difficulty in swallowing or taking deep breaths during these episodes. There are no associated symptoms of nausea, vomiting, or pain. She also reports intermittent abdominal pain, characterized as stabbing in nature, but notes that it is not persistent or frequent. Daily belching is reported, along with occasional acid reflux, which can be severe enough to cause a burning sensation and subsequent dry cough. These symptoms have been present for the past 4 to 6 months. She also reports symptoms of bloating, inflammation, and swelling. She has never undergone an upper endoscopy or EGD. She is not currently on any blood thinners. She has been taking Pepcid for the past month without any noticeable improvement.    Supplemental Information  She started methotrexate 1.5 months ago for psoriatic arthritis. She is seeing Dr. Long, rheumatologist, for this condition.    SOCIAL HISTORY  She does not smoke.    FAMILY HISTORY  Her paternal grandmother had colon cancer.    MEDICATIONS  Current: Methotrexate, Pepcid          Results       Result Review :   The following data was reviewed by: JANINE Christian on 02/28/2025     CMP          1/31/2025    15:18   CMP   Glucose 107    BUN 14   "  Creatinine 0.93    EGFR 85.0    Sodium 139    Potassium 3.4    Chloride 100    Calcium 9.2    Total Protein 7.0    Albumin 4.1    Globulin 2.9    Total Bilirubin <0.2    Alkaline Phosphatase 113    AST (SGOT) 13    ALT (SGPT) 17    Albumin/Globulin Ratio 1.4    BUN/Creatinine Ratio 15.1    Anion Gap 11.0      CBC          1/31/2025    15:18   CBC   WBC 10.31    RBC 4.67    Hemoglobin 13.6    Hematocrit 41.0    MCV 87.8    MCH 29.1    MCHC 33.2    RDW 12.4    Platelets 314        Iron Profile No results found for: \"IRON\", \"TIBC\", \"LABIRON\", \"TRANSFERRIN\"  Ferritin No results found for: \"FERRITIN\"         Results          Past Medical History       Past Medical History:   Diagnosis Date    Anxiety     Depression     Rotator cuff tear     RIGHT       Past Surgical History:   Procedure Laterality Date    KNEE ARTHROSCOPY Left     X2 ACL/MENISCUS REPAIR    SHOULDER ARTHROSCOPY W/ ROTATOR CUFF REPAIR Right 4/8/2024    Procedure: right SHOULDER ARTHROSCOPY, MINI OPEN ROTATOR CUFF REPAIR SUBACROMIAL DECOMPRESSION;  Surgeon: Salvador Cancino MD;  Location: [facility] OR [facility];  Service: Orthopedics;  Laterality: Right;         Current Outpatient Medications:     folic acid (FOLVITE) 1 MG tablet, , Disp: , Rfl:     methotrexate 2.5 MG tablet, , Disp: , Rfl:     pantoprazole (PROTONIX) 40 MG EC tablet, Take 1 tablet by mouth Daily., Disp: 30 tablet, Rfl: 5     No Known Allergies    Family History   Problem Relation Age of Onset    Colon cancer Paternal Grandmother     Malig Hyperthermia Neg Hx     Ovarian cancer Neg Hx     Uterine cancer Neg Hx     Breast cancer Neg Hx     Prostate cancer Neg Hx     Deep vein thrombosis Neg Hx     Pulmonary embolism Neg Hx         Social History     Social History Narrative    Not on file       Objective       Objective     Vital Signs:   /72 (BP Location: Left arm, Patient Position: Sitting, Cuff Size: Adult)   Pulse 81   Ht 172.7 cm (67.99\")   Wt 95.3 kg (210 lb)   SpO2 100%   BMI " 31.94 kg/m²     Body mass index is 31.94 kg/m².    Physical Exam  Constitutional:       Appearance: Normal appearance.   Pulmonary:      Effort: Pulmonary effort is normal.   Neurological:      General: No focal deficit present.      Mental Status: She is alert and oriented to person, place, and time.   Psychiatric:         Mood and Affect: Mood normal.         Behavior: Behavior normal.         Physical Exam                 Assessment & Plan          Assessment and Plan    Diagnoses and all orders for this visit:    1. Abdominal bloating (Primary)  -     Case Request; Standing  -     Follow Anesthesia Guidelines / Protocol; Future  -     Verify NPO; Standing  -     Follow Anesthesia Guidelines / Protocol; Standing  -     Case Request    2. Gastroesophageal reflux disease, unspecified whether esophagitis present  -     Case Request; Standing  -     Follow Anesthesia Guidelines / Protocol; Future  -     Verify NPO; Standing  -     Follow Anesthesia Guidelines / Protocol; Standing  -     Case Request    3. Belching  -     Case Request; Standing  -     Follow Anesthesia Guidelines / Protocol; Future  -     Verify NPO; Standing  -     Follow Anesthesia Guidelines / Protocol; Standing  -     Case Request    4. Chest pain, unspecified type  -     Case Request; Standing  -     Follow Anesthesia Guidelines / Protocol; Future  -     Verify NPO; Standing  -     Follow Anesthesia Guidelines / Protocol; Standing  -     Case Request    Other orders  -     pantoprazole (PROTONIX) 40 MG EC tablet; Take 1 tablet by mouth Daily.  Dispense: 30 tablet; Refill: 5          Assessment & Plan    She reports experiencing chest tightness and pressure immediately after starting to eat, accompanied by frequent belching. There is no associated pain, nausea, vomiting, or difficulty swallowing. She has tried Pepcid for the last month without any improvement. An upper endoscopy (EGD) is recommended to evaluate the esophagus and stomach for  potential issues such as esophagitis, ulcers, or bacterial infections. The procedure will be performed by Dr. Heck at the hospital. She is advised to arrange for a  due to the sedation required for the procedure. She is instructed to fast from midnight the day before the procedure. A prescription for Protonix 40 mg to be taken first thing in the morning has been sent to [facility] pharmacy.    I have recommended that the patient undergo further evaluation with an EGD.  I have discussed this procedure in detail with the patient.  I have discussed the risks, benefits and alternatives.  I have discussed the risk of anesthesia, bleeding and perforation.  Patient understands these risks, benefits and alternatives and wishes to proceed.  I will schedule her at her earliest convenience.          Follow Up       Follow Up   Return for Follow up after endoscopy in office.  Patient was given instructions and counseling regarding her condition or for health maintenance advice. Please see specific information pulled into the AVS if appropriate.

## 2025-03-03 ENCOUNTER — HOSPITAL ENCOUNTER (OUTPATIENT)
Dept: ULTRASOUND IMAGING | Facility: HOSPITAL | Age: 30
Discharge: HOME | End: 2025-03-03
Admitting: SURGERY
Payer: COMMERCIAL

## 2025-03-03 DIAGNOSIS — K82.4 GALLBLADDER POLYP: ICD-10-CM

## 2025-03-03 PROCEDURE — 76705 ECHO EXAM OF ABDOMEN: CPT

## 2025-03-05 ENCOUNTER — TELEPHONE (OUTPATIENT)
Dept: UROLOGY | Age: 30
End: 2025-03-05
Payer: COMMERCIAL

## 2025-03-05 NOTE — TELEPHONE ENCOUNTER
----- Message from Stephanie DAVENPORT sent at 3/5/2025  8:51 AM EST -----  Could you reach out to pt to see if she would like to come in tomorrow at 2:30pm to discuss test results with Dr. Doe? PT is not aware of this appointment.   Thank you  ----- Message -----  From: Antonio Doe MD  Sent: 3/4/2025   5:01 PM EST  To: Stephanie Greer MA    Follow-up with me to discuss  ----- Message -----  From: [place], Rad Results [place]  Sent: 3/4/2025   4:56 PM EST  To: Antonio Doe MD

## 2025-03-05 NOTE — TELEPHONE ENCOUNTER
CALLED PT TO SCHEDULE W/ DR. FUENTES 3/6 @ 2:30 TO DISCUSS RESULTS PER ARIANNA    NO ANSWER, LMOM.

## 2025-03-11 NOTE — PRE-PROCEDURE INSTRUCTIONS
"Instructed on date and arrival time of 0930. Instructed that arrival time is not procedure time but allows time to prepare for procedure. Come to entrance \"C\".  Must have  over age 18 to drive home.  May have two visitors; however, children under 12 must stay in waiting room.  Discussed diet/NPO.  May take medications as usual except for blood thinners, diabetic medications, or weight loss medications.  Verbalized understanding of instructions given.  Instructed to call for questions or concerns.  "

## 2025-03-12 ENCOUNTER — OFFICE VISIT (OUTPATIENT)
Dept: SURGERY | Facility: CLINIC | Age: 30
End: 2025-03-12
Payer: COMMERCIAL

## 2025-03-12 VITALS — BODY MASS INDEX: 31.83 KG/M2 | RESPIRATION RATE: 16 BRPM | WEIGHT: 210 LBS | HEIGHT: 68 IN

## 2025-03-12 DIAGNOSIS — K82.4 GALLBLADDER POLYP: Primary | ICD-10-CM

## 2025-03-12 NOTE — LETTER
March 13, 2025     Alicia Ngo MD  [address]  [address]  [place] [address]    Patient: Dalia Lucio   YOB: 1995   Date of Visit: 3/12/2025     Dear Alicia Ngo MD:       Thank you for referring Dalia Lucio to me for evaluation. Below are the relevant portions of my assessment and plan of care.    If you have questions, please do not hesitate to call me. I look forward to following Dalia along with you.         Sincerely,        Antonio Doe MD        CC: No Recipients    Antonio Doe MD  03/13/25 0810  Sign when Signing Visit  General Surgery/Colorectal Surgery Note    Patient Name:  Dalia Lucio  YOB: 1995  [id number]    Referring Provider: No ref. provider found      Patient Care Team:  Alicia Ngo MD as PCP - General (Family Medicine)    Chief complaint follow-up    Subjective.     History of present illness:    Previously seen. Ultrasound right upper quadrant at outside facility with questionable gallstone versus gallbladder polyp 6 x 4 mm    She comes in for follow-up.  No abdominal pain.  No nausea or vomiting.  No food intolerance.  Ultrasound right upper quadrant   3/4/2025 with 5 mm focus in the gallbladder possible polyp    History:  Past Medical History:   Diagnosis Date   • Anxiety    • Depression    • Fracture, clavicle    • Migraine    • Rotator cuff tear     RIGHT   • Tear of meniscus of knee    • Tendinitis of knee        Past Surgical History:   Procedure Laterality Date   • FOOT SURGERY     • KNEE ARTHROSCOPY Left     X2 ACL/MENISCUS REPAIR   • SHOULDER ARTHROSCOPY W/ ROTATOR CUFF REPAIR Right 04/08/2024    Procedure: right SHOULDER ARTHROSCOPY, MINI OPEN ROTATOR CUFF REPAIR SUBACROMIAL DECOMPRESSION;  Surgeon: Salvador Cancino MD;  Location: [facility];  Service: Orthopedics;  Laterality: Right;   • SHOULDER SURGERY  April 8, 2024   • WISDOM TOOTH EXTRACTION         Family History   Problem  Relation Age of Onset   • Colon cancer Paternal Grandmother    • Malig Hyperthermia Neg Hx    • Ovarian cancer Neg Hx    • Uterine cancer Neg Hx    • Breast cancer Neg Hx    • Prostate cancer Neg Hx    • Deep vein thrombosis Neg Hx    • Pulmonary embolism Neg Hx        Social History     Tobacco Use   • Smoking status: Never     Passive exposure: Never   • Smokeless tobacco: Never   Vaping Use   • Vaping status: Never Used   Substance Use Topics   • Alcohol use: Never   • Drug use: Never       Review of Systems  All systems were reviewed and negative except for:   Review of Systems   Constitutional: Negative for chills, fever and unexpected weight loss.   HENT: Negative for congestion, nosebleeds and voice change.    Eyes: Negative for blurred vision, double vision and discharge.   Respiratory: Negative for apnea, chest tightness and shortness of breath.    Cardiovascular: Negative for chest pain and leg swelling.   Gastrointestinal:        See HPI   Endocrine: Negative for cold intolerance and heat intolerance.   Genitourinary: Negative for dysuria, hematuria and urgency.   Musculoskeletal: Negative for back pain, joint swelling and neck pain.   Skin: Negative for color change and dry skin.   Neurological: Negative for dizziness and confusion.   Hematological: Negative for adenopathy.   Psychiatric/Behavioral: Negative for agitation and behavioral problems.     MEDS:  Prior to Admission medications    Medication Sig Start Date End Date Taking? Authorizing Provider   folic acid (FOLVITE) 1 MG tablet  2/4/25  Yes ProviderJassi MD   methotrexate 2.5 MG tablet  2/4/25  Yes ProviderJassi MD   pantoprazole (PROTONIX) 40 MG EC tablet Take 1 tablet by mouth Daily. 2/28/25  Yes Bridget Mcdonald APRN        Allergies:  Patient has no known allergies.    Objective    Vital Signs   Resp:  [16] 16    Physical Exam:     General Appearance:    Alert, cooperative, in no acute distress   Head:    Normocephalic, without  "obvious abnormality, atraumatic   Eyes:          Conjunctivae and sclerae normal, no icterus,     Ears:    Ears appear intact with no abnormalities noted   Throat:   No oral lesions, no thrush, oral mucosa moist   Neck:   No adenopathy, supple, trachea midline, no thyromegaly   Back:     No kyphosis present, no scoliosis present, no skin lesions,      erythema or scars, no tenderness to percussion or                   palpation,   range of motion normal   Lungs:     Clear to auscultation,respirations regular, even and                  unlabored    Heart:    Regular rhythm and normal rate, normal S1 and S2, no            murmur, no gallop, no rub, no click   Chest Wall:    No abnormalities observed   Abdomen:     Normal bowel sounds, no masses, no organomegaly, soft        non-tender, non-distended, no guarding, no rebound                tenderness   Rectal:        Extremities:   Moves all extremities well, no edema, no cyanosis, no             redness   Pulses:   Pulses palpable and equal bilaterally   Skin:   No bleeding, bruising or rash   Lymph nodes:   No palpable adenopathy   Neurologic:   A/o x 4 with no deficits       Results Review:   {Results Review:02516::\"I reviewed the patient's new clinical results.\"    LABS/IMAGING:  Results for orders placed or performed in visit on 01/31/25   C-reactive Protein    Collection Time: 01/31/25  3:18 PM    Specimen: Blood   Result Value Ref Range    C-Reactive Protein 0.77 (H) 0.00 - 0.50 mg/dL   Comprehensive Metabolic Panel    Collection Time: 01/31/25  3:18 PM    Specimen: Blood   Result Value Ref Range    Glucose 107 (H) 65 - 99 mg/dL    BUN 14 6 - 20 mg/dL    Creatinine 0.93 0.57 - 1.00 mg/dL    Sodium 139 136 - 145 mmol/L    Potassium 3.4 (L) 3.5 - 5.2 mmol/L    Chloride 100 98 - 107 mmol/L    CO2 28.0 22.0 - 29.0 mmol/L    Calcium 9.2 8.6 - 10.5 mg/dL    Total Protein 7.0 6.0 - 8.5 g/dL    Albumin 4.1 3.5 - 5.2 g/dL    ALT (SGPT) 17 1 - 33 U/L    AST (SGOT) 13 1 - " 32 U/L    Alkaline Phosphatase 113 39 - 117 U/L    Total Bilirubin <0.2 0.0 - 1.2 mg/dL    Globulin 2.9 gm/dL    A/G Ratio 1.4 g/dL    BUN/Creatinine Ratio 15.1 7.0 - 25.0    Anion Gap 11.0 5.0 - 15.0 mmol/L    eGFR 85.0 >60.0 mL/min/1.73   HLA-B27 Antigen    Collection Time: 01/31/25  3:18 PM    Specimen: Blood   Result Value Ref Range    HLA B27 Negative    Hepatitis B Core Antibody, Total    Collection Time: 01/31/25  3:18 PM    Specimen: Blood   Result Value Ref Range    Hep B Core Total Ab Negative Negative   Hepatitis B Surface Antigen    Collection Time: 01/31/25  3:18 PM    Specimen: Blood   Result Value Ref Range    Hepatitis B Surface Ag Non-Reactive Non-Reactive   CBC Auto Differential    Collection Time: 01/31/25  3:18 PM    Specimen: Blood   Result Value Ref Range    WBC 10.31 3.40 - 10.80 10*3/mm3    RBC 4.67 3.77 - 5.28 10*6/mm3    Hemoglobin 13.6 12.0 - 15.9 g/dL    Hematocrit 41.0 34.0 - 46.6 %    MCV 87.8 79.0 - 97.0 fL    MCH 29.1 26.6 - 33.0 pg    MCHC 33.2 31.5 - 35.7 g/dL    RDW 12.4 12.3 - 15.4 %    RDW-SD 39.6 37.0 - 54.0 fl    MPV 9.5 6.0 - 12.0 fL    Platelets 314 140 - 450 10*3/mm3    Neutrophil % 62.8 42.7 - 76.0 %    Lymphocyte % 25.6 19.6 - 45.3 %    Monocyte % 7.8 5.0 - 12.0 %    Eosinophil % 2.5 0.3 - 6.2 %    Basophil % 0.5 0.0 - 1.5 %    Immature Grans % 0.8 (H) 0.0 - 0.5 %    Neutrophils, Absolute 6.48 1.70 - 7.00 10*3/mm3    Lymphocytes, Absolute 2.64 0.70 - 3.10 10*3/mm3    Monocytes, Absolute 0.80 0.10 - 0.90 10*3/mm3    Eosinophils, Absolute 0.26 0.00 - 0.40 10*3/mm3    Basophils, Absolute 0.05 0.00 - 0.20 10*3/mm3    Immature Grans, Absolute 0.08 (H) 0.00 - 0.05 10*3/mm3    nRBC 0.0 0.0 - 0.2 /100 WBC        Result Review: Labs  Result Review  Imaging  Med Tab  Media     Assessment & Plan    Gallbladder polyp    I reviewed the findings of the recent ultrasound with the patient.  We discussed continued surveillance versus cholecystectomy.  She wishes to proceed with  surveillance.  Ultrasound right upper quadrant ordered for 6 months from now.  Follow-up with me after.  All questions answered.  She agrees with the plan.  Thank for the consult.                This document has been electronically signed by Antonio Doe MD  March 13, 2025 08:09 EDT

## 2025-03-13 ENCOUNTER — ANESTHESIA EVENT (OUTPATIENT)
Dept: GASTROENTEROLOGY | Facility: HOSPITAL | Age: 30
End: 2025-03-13
Payer: COMMERCIAL

## 2025-03-13 NOTE — ANESTHESIA PREPROCEDURE EVALUATION
Anesthesia Evaluation     Patient summary reviewed and Nursing notes reviewed   NPO Solid Status: > 8 hours  NPO Liquid Status: > 4 hours           Airway   Mallampati: I  TM distance: >3 FB  Neck ROM: full  No difficulty expected  Dental - normal exam     Pulmonary - negative pulmonary ROS    breath sounds clear to auscultation  Cardiovascular - normal exam  Exercise tolerance: good (4-7 METS)    Rhythm: regular  Rate: normal        Neuro/Psych  (+) headaches, psychiatric history Anxiety and Depression  GI/Hepatic/Renal/Endo    (+) obesity, GERD poorly controlled    Musculoskeletal     Abdominal    Substance History - negative use     OB/GYN          Other        ROS/Med Hx Other: Abd bloating, GERD, belching    No EKG on file    HCG pending                          Anesthesia Plan    ASA 2     general   total IV anesthesia  (Total IV Anesthesia    Patient understands anesthesia not responsible for dental damage.      Discussed risks with pt including aspiration, allergic reactions, apnea, advanced airway placement. Pt verbalized understanding. All questions answered.   )  intravenous induction     Anesthetic plan, risks, benefits, and alternatives have been provided, discussed and informed consent has been obtained with: patient.  Pre-procedure education provided  Plan discussed with CRNA.      CODE STATUS:

## 2025-03-14 ENCOUNTER — HOSPITAL ENCOUNTER (OUTPATIENT)
Facility: HOSPITAL | Age: 30
Setting detail: HOSPITAL OUTPATIENT SURGERY
Discharge: HOME | End: 2025-03-14
Attending: INTERNAL MEDICINE | Admitting: INTERNAL MEDICINE
Payer: COMMERCIAL

## 2025-03-14 ENCOUNTER — ANESTHESIA (OUTPATIENT)
Dept: GASTROENTEROLOGY | Facility: HOSPITAL | Age: 30
End: 2025-03-14
Payer: COMMERCIAL

## 2025-03-14 VITALS
SYSTOLIC BLOOD PRESSURE: 118 MMHG | OXYGEN SATURATION: 99 % | RESPIRATION RATE: 18 BRPM | HEART RATE: 71 BPM | BODY MASS INDEX: 31.34 KG/M2 | WEIGHT: 206.13 LBS | DIASTOLIC BLOOD PRESSURE: 62 MMHG | TEMPERATURE: 97 F

## 2025-03-14 DIAGNOSIS — R07.9 CHEST PAIN, UNSPECIFIED TYPE: ICD-10-CM

## 2025-03-14 DIAGNOSIS — R14.2 BELCHING: ICD-10-CM

## 2025-03-14 DIAGNOSIS — K21.9 GASTROESOPHAGEAL REFLUX DISEASE, UNSPECIFIED WHETHER ESOPHAGITIS PRESENT: ICD-10-CM

## 2025-03-14 DIAGNOSIS — R14.0 ABDOMINAL BLOATING: ICD-10-CM

## 2025-03-14 LAB — B-HCG UR QL: NEGATIVE

## 2025-03-14 PROCEDURE — 25010000002 LIDOCAINE PF 2% 2 % SOLUTION: Performed by: NURSE ANESTHETIST, CERTIFIED REGISTERED

## 2025-03-14 PROCEDURE — 25010000002 PROPOFOL 10 MG/ML EMULSION: Performed by: NURSE ANESTHETIST, CERTIFIED REGISTERED

## 2025-03-14 PROCEDURE — 43239 EGD BIOPSY SINGLE/MULTIPLE: CPT | Performed by: INTERNAL MEDICINE

## 2025-03-14 PROCEDURE — 81025 URINE PREGNANCY TEST: CPT | Performed by: INTERNAL MEDICINE

## 2025-03-14 PROCEDURE — 25810000003 LACTATED RINGERS PER 1000 ML: Performed by: NURSE ANESTHETIST, CERTIFIED REGISTERED

## 2025-03-14 PROCEDURE — 88305 TISSUE EXAM BY PATHOLOGIST: CPT | Performed by: INTERNAL MEDICINE

## 2025-03-14 RX ORDER — LIDOCAINE HYDROCHLORIDE 10 MG/ML
0.5 INJECTION, SOLUTION INFILTRATION; PERINEURAL ONCE AS NEEDED
Status: DISCONTINUED | OUTPATIENT
Start: 2025-03-14 | End: 2025-03-14 | Stop reason: HOSPADM

## 2025-03-14 RX ORDER — IBUPROFEN 200 MG
200 TABLET ORAL EVERY 6 HOURS PRN
COMMUNITY

## 2025-03-14 RX ORDER — LIDOCAINE HYDROCHLORIDE 20 MG/ML
INJECTION, SOLUTION EPIDURAL; INFILTRATION; INTRACAUDAL; PERINEURAL AS NEEDED
Status: DISCONTINUED | OUTPATIENT
Start: 2025-03-14 | End: 2025-03-14 | Stop reason: SURG

## 2025-03-14 RX ORDER — PROPOFOL 10 MG/ML
VIAL (ML) INTRAVENOUS AS NEEDED
Status: DISCONTINUED | OUTPATIENT
Start: 2025-03-14 | End: 2025-03-14 | Stop reason: SURG

## 2025-03-14 RX ORDER — SODIUM CHLORIDE, SODIUM LACTATE, POTASSIUM CHLORIDE, CALCIUM CHLORIDE 600; 310; 30; 20 MG/100ML; MG/100ML; MG/100ML; MG/100ML
1000 INJECTION, SOLUTION INTRAVENOUS CONTINUOUS
Status: DISCONTINUED | OUTPATIENT
Start: 2025-03-14 | End: 2025-03-14 | Stop reason: HOSPADM

## 2025-03-14 RX ORDER — SODIUM CHLORIDE 0.9 % (FLUSH) 0.9 %
10 SYRINGE (ML) INJECTION AS NEEDED
Status: DISCONTINUED | OUTPATIENT
Start: 2025-03-14 | End: 2025-03-14 | Stop reason: HOSPADM

## 2025-03-14 RX ADMIN — PROPOFOL 50 MG: 10 INJECTION, EMULSION INTRAVENOUS at 12:10

## 2025-03-14 RX ADMIN — PROPOFOL 100 MG: 10 INJECTION, EMULSION INTRAVENOUS at 12:08

## 2025-03-14 RX ADMIN — PROPOFOL 100 MG: 10 INJECTION, EMULSION INTRAVENOUS at 12:11

## 2025-03-14 RX ADMIN — LIDOCAINE HYDROCHLORIDE 100 MG: 20 INJECTION, SOLUTION EPIDURAL; INFILTRATION; INTRACAUDAL; PERINEURAL at 12:08

## 2025-03-14 RX ADMIN — SODIUM CHLORIDE, SODIUM LACTATE, POTASSIUM CHLORIDE, CALCIUM CHLORIDE 1000 ML: 20; 30; 600; 310 INJECTION, SOLUTION INTRAVENOUS at 11:54

## 2025-03-14 NOTE — ANESTHESIA POSTPROCEDURE EVALUATION
Patient: Dalia Lucio    Procedure Summary       Date: 03/14/25 Room / Location: [facility] ENDOSCOPY 2 / [facility] ENDOSCOPY    Anesthesia Start: 1205 Anesthesia Stop: 1217    Procedure: ESOPHAGOGASTRODUODENOSCOPY WITH BIOPSIES Diagnosis:       Abdominal bloating      Gastroesophageal reflux disease, unspecified whether esophagitis present      Belching      Chest pain, unspecified type      (Abdominal bloating [R14.0])      (Gastroesophageal reflux disease, unspecified whether esophagitis present [K21.9])      (Belching [R14.2])      (Chest pain, unspecified type [R07.9])    Surgeons: Layton Heck MD Provider: Noni Jackson CRNA    Anesthesia Type: general ASA Status: 2            Anesthesia Type: general    Vitals  Vitals Value Taken Time   /62 03/14/25 12:36   Temp 36.1 °C (97 °F) 03/14/25 12:36   Pulse 71 03/14/25 12:36   Resp 18 03/14/25 12:36   SpO2 99 % 03/14/25 12:36           Post Anesthesia Care and Evaluation    Patient location during evaluation: bedside  Patient participation: complete - patient participated  Level of consciousness: awake  Pain management: adequate    Airway patency: patent  Anesthetic complications: No anesthetic complications  PONV Status: controlled  Cardiovascular status: acceptable and stable  Respiratory status: acceptable

## 2025-03-17 LAB
CYTO UR: NORMAL
LAB AP CASE REPORT: NORMAL
LAB AP CLINICAL INFORMATION: NORMAL
PATH REPORT.FINAL DX SPEC: NORMAL
PATH REPORT.GROSS SPEC: NORMAL

## 2025-03-31 ENCOUNTER — LAB (OUTPATIENT)
Dept: LAB | Facility: HOSPITAL | Age: 30
End: 2025-03-31
Payer: COMMERCIAL

## 2025-03-31 DIAGNOSIS — Z11.1 SCREENING FOR TUBERCULOSIS: ICD-10-CM

## 2025-03-31 DIAGNOSIS — Z11.59 SCREENING FOR VIRAL DISEASE: Primary | ICD-10-CM

## 2025-03-31 DIAGNOSIS — Z79.4 ENCOUNTER FOR LONG-TERM (CURRENT) USE OF INSULIN: ICD-10-CM

## 2025-03-31 DIAGNOSIS — Z11.59 SCREENING FOR VIRAL DISEASE: ICD-10-CM

## 2025-03-31 LAB
ALBUMIN SERPL-MCNC: 4.1 G/DL (ref 3.5–5.2)
ALT SERPL W P-5'-P-CCNC: 38 U/L (ref 1–33)
AST SERPL-CCNC: 26 U/L (ref 1–32)
BASOPHILS # BLD AUTO: 0.04 10*3/MM3 (ref 0–0.2)
BASOPHILS NFR BLD AUTO: 0.6 % (ref 0–1.5)
CREAT SERPL-MCNC: 0.94 MG/DL (ref 0.57–1)
CRP SERPL-MCNC: 1.09 MG/DL (ref 0–0.5)
DEPRECATED RDW RBC AUTO: 43.3 FL (ref 37–54)
EGFRCR SERPLBLD CKD-EPI 2021: 83.9 ML/MIN/1.73
EOSINOPHIL # BLD AUTO: 0.71 10*3/MM3 (ref 0–0.4)
EOSINOPHIL NFR BLD AUTO: 9.9 % (ref 0.3–6.2)
ERYTHROCYTE [DISTWIDTH] IN BLOOD BY AUTOMATED COUNT: 13.3 % (ref 12.3–15.4)
HAV IGM SERPL QL IA: NORMAL
HBV CORE IGM SERPL QL IA: NORMAL
HBV SURFACE AG SERPL QL IA: NORMAL
HCT VFR BLD AUTO: 37.3 % (ref 34–46.6)
HCV AB SER QL: NORMAL
HGB BLD-MCNC: 12.4 G/DL (ref 12–15.9)
IMM GRANULOCYTES # BLD AUTO: 0.03 10*3/MM3 (ref 0–0.05)
IMM GRANULOCYTES NFR BLD AUTO: 0.4 % (ref 0–0.5)
LYMPHOCYTES # BLD AUTO: 2.59 10*3/MM3 (ref 0.7–3.1)
LYMPHOCYTES NFR BLD AUTO: 36.2 % (ref 19.6–45.3)
MCH RBC QN AUTO: 29.5 PG (ref 26.6–33)
MCHC RBC AUTO-ENTMCNC: 33.2 G/DL (ref 31.5–35.7)
MCV RBC AUTO: 88.8 FL (ref 79–97)
MONOCYTES # BLD AUTO: 0.48 10*3/MM3 (ref 0.1–0.9)
MONOCYTES NFR BLD AUTO: 6.7 % (ref 5–12)
NEUTROPHILS NFR BLD AUTO: 3.31 10*3/MM3 (ref 1.7–7)
NEUTROPHILS NFR BLD AUTO: 46.2 % (ref 42.7–76)
NRBC BLD AUTO-RTO: 0 /100 WBC (ref 0–0.2)
PLATELET # BLD AUTO: 312 10*3/MM3 (ref 140–450)
PMV BLD AUTO: 9.6 FL (ref 6–12)
RBC # BLD AUTO: 4.2 10*6/MM3 (ref 3.77–5.28)
WBC NRBC COR # BLD AUTO: 7.16 10*3/MM3 (ref 3.4–10.8)

## 2025-03-31 PROCEDURE — 80074 ACUTE HEPATITIS PANEL: CPT

## 2025-03-31 PROCEDURE — 84460 ALANINE AMINO (ALT) (SGPT): CPT

## 2025-03-31 PROCEDURE — 86480 TB TEST CELL IMMUN MEASURE: CPT

## 2025-03-31 PROCEDURE — 82565 ASSAY OF CREATININE: CPT

## 2025-03-31 PROCEDURE — 85025 COMPLETE CBC W/AUTO DIFF WBC: CPT

## 2025-03-31 PROCEDURE — 84450 TRANSFERASE (AST) (SGOT): CPT

## 2025-03-31 PROCEDURE — 81374 HLA I TYPING 1 ANTIGEN LR: CPT

## 2025-03-31 PROCEDURE — 82040 ASSAY OF SERUM ALBUMIN: CPT

## 2025-03-31 PROCEDURE — 36415 COLL VENOUS BLD VENIPUNCTURE: CPT

## 2025-03-31 PROCEDURE — 86140 C-REACTIVE PROTEIN: CPT

## 2025-04-02 LAB
GAMMA INTERFERON BACKGROUND BLD IA-ACNC: 0.04 IU/ML
M TB IFN-G BLD-IMP: NEGATIVE
M TB IFN-G CD4+ BCKGRND COR BLD-ACNC: 0.05 IU/ML
M TB IFN-G CD4+CD8+ BCKGRND COR BLD-ACNC: 0.06 IU/ML
MITOGEN IGNF BCKGRD COR BLD-ACNC: >10 IU/ML
QUANTIFERON INCUBATION: NORMAL
SERVICE CMNT-IMP: NORMAL

## 2025-04-07 LAB — HLA-B27 QL NAA+PROBE: NEGATIVE

## 2025-07-15 ENCOUNTER — OFFICE VISIT (OUTPATIENT)
Dept: GASTROENTEROLOGY | Facility: CLINIC | Age: 30
End: 2025-07-15
Payer: COMMERCIAL

## 2025-07-15 VITALS
HEIGHT: 68 IN | SYSTOLIC BLOOD PRESSURE: 125 MMHG | BODY MASS INDEX: 31.01 KG/M2 | HEART RATE: 77 BPM | DIASTOLIC BLOOD PRESSURE: 66 MMHG | WEIGHT: 204.6 LBS

## 2025-07-15 DIAGNOSIS — K21.9 GASTROESOPHAGEAL REFLUX DISEASE WITHOUT ESOPHAGITIS: Primary | ICD-10-CM

## 2025-07-15 PROCEDURE — 99213 OFFICE O/P EST LOW 20 MIN: CPT

## 2025-07-15 RX ORDER — MULTIVIT AND MINERALS-FERROUS GLUCONATE 9 MG IRON/15 ML ORAL LIQUID 9 MG/15 ML
LIQUID (ML) ORAL DAILY
COMMUNITY

## 2025-07-15 RX ORDER — CHOLECALCIFEROL (VITAMIN D3) 25 MCG
1000 TABLET ORAL DAILY
COMMUNITY

## 2025-07-15 RX ORDER — PANTOPRAZOLE SODIUM 40 MG/1
40 TABLET, DELAYED RELEASE ORAL DAILY
Qty: 90 TABLET | Refills: 3 | Status: SHIPPED | OUTPATIENT
Start: 2025-07-15

## 2025-07-15 RX ORDER — APREMILAST 30 MG/1
60 TABLET, FILM COATED ORAL
COMMUNITY

## 2025-07-15 RX ORDER — CHLORAL HYDRATE 500 MG
CAPSULE ORAL
COMMUNITY

## 2025-07-15 NOTE — PROGRESS NOTES
Chief Complaint  Heartburn (Epigastric burning sensation )    Dalia Lucio is a 29 y.o. female who presents to [facility] [place] for epigastric burning.    History of present Illness  Patient presents to the office for EGD follow-up with epigastric burning.  Patient took Protonix 40 mg daily for about 1-2 months following EGD with great improvement and resolution of symptoms.  Unfortunately when coming off medicine she had return of symptoms.  She does feel at this point epigastric burning is more infrequent.  Denies nausea, vomiting, and dysphagia.  Denies lower GI symptoms such as change in bowel habits, constipation, diarrhea, melena, and hematochezia.    EGD 3/14/25 by Dr. Heck - LA grade A esophagitis, normal stomach and duodenum. Esophageal biopsies normal.  Stomach biopsies-mild chronic inactive gastritis.  Duodenum biopsies normal. Continue Protonix 40mg daily.     Past Medical History:   Diagnosis Date    Anxiety     Depression     Fracture, clavicle     Migraine     Rotator cuff tear     RIGHT    Tear of meniscus of knee     Tendinitis of knee        Past Surgical History:   Procedure Laterality Date    ENDOSCOPY N/A 3/14/2025    Procedure: ESOPHAGOGASTRODUODENOSCOPY WITH BIOPSIES;  Surgeon: Layton Heck MD;  Location: [facility] END[facility];  Service: Gastroenterology;  Laterality: N/A;  ESOPHAGITIS    FOOT SURGERY      KNEE ARTHROSCOPY Left     X2 ACL/MENISCUS REPAIR    SHOULDER ARTHROSCOPY W/ ROTATOR CUFF REPAIR Right 04/08/2024    Procedure: right SHOULDER ARTHROSCOPY, MINI OPEN ROTATOR CUFF REPAIR SUBACROMIAL DECOMPRESSION;  Surgeon: Salvador Cancino MD;  Location: [facility];  Service: Orthopedics;  Laterality: Right;    SHOULDER SURGERY  April 8, 2024    WISDOM TOOTH EXTRACTION           Current Outpatient Medications:     Apremilast (Otezla) 30 MG tablet, Take 60 mg by mouth., Disp: , Rfl:     Cholecalciferol 25 MCG (1000 UT) tablet, Take 1  "tablet by mouth Daily., Disp: , Rfl:     diclofenac (VOLTAREN) 50 MG EC tablet, , Disp: , Rfl:     MAGNESIUM CITRATE PO, Take  by mouth., Disp: , Rfl:     multivitamin and minerals liquid liquid, Take  by mouth Daily., Disp: , Rfl:     Omega-3 Fatty Acids (fish oil) 1000 MG capsule capsule, Take  by mouth Daily With Breakfast., Disp: , Rfl:     pantoprazole (PROTONIX) 40 MG EC tablet, Take 1 tablet by mouth Daily., Disp: 90 tablet, Rfl: 3    Probiotic Product (PROBIOTIC ADVANCED PO), Take  by mouth., Disp: , Rfl:      No Known Allergies    Family History   Problem Relation Age of Onset    Colon cancer Paternal Grandmother 60 - 69    Malig Hyperthermia Neg Hx     Ovarian cancer Neg Hx     Uterine cancer Neg Hx     Breast cancer Neg Hx     Prostate cancer Neg Hx     Deep vein thrombosis Neg Hx     Pulmonary embolism Neg Hx         Social History     Social History Narrative    Not on file       Objective       Vital Signs:   /66 (BP Location: Left arm, Patient Position: Sitting, Cuff Size: Adult)   Pulse 77   Ht 172.7 cm (68\")   Wt 92.8 kg (204 lb 9.6 oz)   BMI 31.11 kg/m²       Physical Exam  Constitutional:       Appearance: Normal appearance. She is normal weight.   HENT:      Head: Normocephalic and atraumatic.      Nose: Nose normal.   Pulmonary:      Effort: Pulmonary effort is normal.   Skin:     General: Skin is warm and dry.   Neurological:      Mental Status: She is alert and oriented to person, place, and time. Mental status is at baseline.   Psychiatric:         Mood and Affect: Mood normal.         Behavior: Behavior normal.         Thought Content: Thought content normal.         Judgment: Judgment normal.         Result Review :       CBC w/diff          1/31/2025    15:18 3/31/2025    15:47   CBC w/Diff   WBC 10.31  7.16    RBC 4.67  4.20    Hemoglobin 13.6  12.4    Hematocrit 41.0  37.3    MCV 87.8  88.8    MCH 29.1  29.5    MCHC 33.2  33.2    RDW 12.4  13.3    Platelets 314  312  "   Neutrophil Rel % 62.8  46.2    Immature Granulocyte Rel % 0.8  0.4    Lymphocyte Rel % 25.6  36.2    Monocyte Rel % 7.8  6.7    Eosinophil Rel % 2.5  9.9    Basophil Rel % 0.5  0.6      CMP          1/31/2025    15:18 3/31/2025    15:47   CMP   Glucose 107     BUN 14     Creatinine 0.93  0.94    EGFR 85.0  83.9    Sodium 139     Potassium 3.4     Chloride 100     Calcium 9.2     Total Protein 7.0     Albumin 4.1  4.1    Globulin 2.9     Total Bilirubin <0.2     Alkaline Phosphatase 113     AST (SGOT) 13  26    ALT (SGPT) 17  38    Albumin/Globulin Ratio 1.4     BUN/Creatinine Ratio 15.1     Anion Gap 11.0               Assessment and Plan    Diagnoses and all orders for this visit:    1. Gastroesophageal reflux disease without esophagitis (Primary)    Other orders  -     pantoprazole (PROTONIX) 40 MG EC tablet; Take 1 tablet by mouth Daily.  Dispense: 90 tablet; Refill: 3    29-year-old patient presents to the office for EGD follow-up with epigastric burning.  Reviewed most recent EGD and pathology report with patient.  Patient took Protonix 40 mg daily for about 1-2 months following EGD with great improvement and resolution of symptoms.  Unfortunately when coming off medicine she had return of symptoms.  She does feel at this point epigastric burning is more infrequent.  Prescription refill for Protonix 40 mg daily sent to pharmacy to utilize on a as needed basis.  Overall patient is doing very well from a GI standpoint we will follow-up on as-needed basis.  Patient is agreeable to plan will call office any questions or concerns.    Follow Up   Return if symptoms worsen or fail to improve.  Patient was given instructions and counseling regarding her condition or for health maintenance advice. Please see specific information pulled into the AVS if appropriate.

## (undated) DEVICE — Device

## (undated) DEVICE — 450 ML BOTTLE OF 0.05% CHLORHEXIDINE GLUCONATE IN 99.95% STERILE WATER FOR IRRIGATION, USP AND APPLICATOR.: Brand: IRRISEPT ANTIMICROBIAL WOUND LAVAGE

## (undated) DEVICE — DRESSING,GAUZE,XEROFORM,CURAD,1"X8",ST: Brand: CURAD

## (undated) DEVICE — SHOULDER ARTHROSCOPY-LF: Brand: MEDLINE INDUSTRIES, INC.

## (undated) DEVICE — KT INST FOR FIBERTAK ANCHR 2.6MM DISP

## (undated) DEVICE — INTEGRATED CASSETTE TUBING, DO NOT USE IF PACKAGE IS DAMAGED: Brand: CROSSFLOW

## (undated) DEVICE — SLV SCD KN/LEN ADJ EXPRSS BLENDED MD 1P/U

## (undated) DEVICE — BLD CUT FORMLA AGGR PLS 5.0MM

## (undated) DEVICE — DEFENDO AIR WATER SUCTION AND BIOPSY VALVE KIT FOR  OLYMPUS: Brand: DEFENDO AIR/WATER/SUCTION AND BIOPSY VALVE

## (undated) DEVICE — CONN JET HYDRA H20 AUXILIARY DISP

## (undated) DEVICE — GLOVE,SURG,SENSICARE SLT,LF,PF,7.5: Brand: MEDLINE

## (undated) DEVICE — TBG PENCL TELESCP MEGADYNE SMOKE EVAC 10FT

## (undated) DEVICE — PAD GRND REM POLYHESIVE A/ DISP

## (undated) DEVICE — SHOULDER P.A.D. III: Brand: DEROYAL

## (undated) DEVICE — SUT ETHLN 3-0 FS118IN 663H

## (undated) DEVICE — SINGLE-USE BIOPSY FORCEPS: Brand: RADIAL JAW 4

## (undated) DEVICE — LINER SURG CANSTR SXN S/RIGD 1500CC

## (undated) DEVICE — BUR BRL FORMLA 12FLUT 4MM

## (undated) DEVICE — SUT MONOCRYL PLS ANTIB UND 3/0  PS1 27IN

## (undated) DEVICE — CANN TWST IN TRPL DAM 7CM 8.25MM

## (undated) DEVICE — SOLIDIFIER LIQLOC PLS 1500CC BT

## (undated) DEVICE — INTENDED FOR TISSUE SEPARATION, AND OTHER PROCEDURES THAT REQUIRE A SHARP SURGICAL BLADE TO PUNCTURE OR CUT.: Brand: BARD-PARKER ® CARBON RIB-BACK BLADES

## (undated) DEVICE — SUT PROLN 0 CT1 30IN 8424H

## (undated) DEVICE — NDL HVY/DUTY SCORPION W/MEGALOADER REUS

## (undated) DEVICE — BLCK/BITE BLOX WO/DENTL/RIM W/STRAP 54F

## (undated) DEVICE — STERILE POLYISOPRENE POWDER-FREE SURGICAL GLOVES WITH EMOLLIENT COATING: Brand: PROTEXIS

## (undated) DEVICE — GLV SURG SENSICARE PI ORTHO SZ8 LF STRL

## (undated) DEVICE — SOL IRR NACL 0.9PCT 3000ML

## (undated) DEVICE — SOL IRRG H2O PL/BG 1000ML STRL

## (undated) DEVICE — STRIP,CLOSURE,WOUND,MEDI-STRIP,1/2X4: Brand: MEDLINE

## (undated) DEVICE — 90-S CRUISE, SUCTION PROBE, NON-BENDABLE, MAX CUT LEVEL 1: Brand: SERFAS ENERGY

## (undated) DEVICE — ELECTRD BLD EDGE COAT 3IN

## (undated) DEVICE — SLV DISTRACTION STAR VELCRO XL DISP